# Patient Record
Sex: FEMALE | Race: WHITE | NOT HISPANIC OR LATINO | Employment: OTHER | URBAN - METROPOLITAN AREA
[De-identification: names, ages, dates, MRNs, and addresses within clinical notes are randomized per-mention and may not be internally consistent; named-entity substitution may affect disease eponyms.]

---

## 2017-09-12 ENCOUNTER — HISTORICAL (OUTPATIENT)
Dept: RADIOLOGY | Facility: HOSPITAL | Age: 62
End: 2017-09-12

## 2017-10-09 ENCOUNTER — HISTORICAL (OUTPATIENT)
Dept: CARDIOLOGY | Facility: CLINIC | Age: 62
End: 2017-10-09

## 2017-10-09 LAB
ALBUMIN SERPL-MCNC: 3.7 GM/DL (ref 3.4–5)
ALBUMIN/GLOB SERPL: 1 RATIO (ref 1–2)
ALP SERPL-CCNC: 67 UNIT/L (ref 45–117)
ALT SERPL-CCNC: 22 UNIT/L (ref 12–78)
AST SERPL-CCNC: 23 UNIT/L (ref 15–37)
BILIRUB SERPL-MCNC: 0.5 MG/DL (ref 0.2–1)
BILIRUBIN DIRECT+TOT PNL SERPL-MCNC: 0.2 MG/DL
BILIRUBIN DIRECT+TOT PNL SERPL-MCNC: 0.3 MG/DL
BUN SERPL-MCNC: 14 MG/DL (ref 7–18)
CALCIUM SERPL-MCNC: 8.4 MG/DL (ref 8.5–10.1)
CHLORIDE SERPL-SCNC: 105 MMOL/L (ref 98–107)
CHOLEST SERPL-MCNC: 166 MG/DL
CHOLEST/HDLC SERPL: 2.1 {RATIO} (ref 0–4.4)
CO2 SERPL-SCNC: 28 MMOL/L (ref 21–32)
CREAT SERPL-MCNC: 0.5 MG/DL (ref 0.6–1.3)
GLOBULIN SER-MCNC: 3.7 GM/ML (ref 2.3–3.5)
GLUCOSE SERPL-MCNC: 101 MG/DL (ref 74–106)
HDLC SERPL-MCNC: 80 MG/DL
LDLC SERPL CALC-MCNC: 66 MG/DL (ref 0–130)
POTASSIUM SERPL-SCNC: 4.5 MMOL/L (ref 3.5–5.1)
PROT SERPL-MCNC: 7.4 GM/DL (ref 6.4–8.2)
SODIUM SERPL-SCNC: 140 MMOL/L (ref 136–145)
TRIGL SERPL-MCNC: 100 MG/DL
VLDLC SERPL CALC-MCNC: 20 MG/DL

## 2019-06-17 ENCOUNTER — HISTORICAL (OUTPATIENT)
Dept: CARDIOLOGY | Facility: CLINIC | Age: 64
End: 2019-06-17

## 2019-06-17 LAB
ALBUMIN SERPL-MCNC: 3.6 GM/DL (ref 3.4–5)
ALBUMIN/GLOB SERPL: 0.9 RATIO (ref 1.1–2)
ALP SERPL-CCNC: 77 UNIT/L (ref 45–117)
ALT SERPL-CCNC: 18 UNIT/L (ref 12–78)
AST SERPL-CCNC: 19 UNIT/L (ref 15–37)
BILIRUB SERPL-MCNC: 0.6 MG/DL (ref 0.2–1)
BILIRUBIN DIRECT+TOT PNL SERPL-MCNC: 0.2 MG/DL
BILIRUBIN DIRECT+TOT PNL SERPL-MCNC: 0.4 MG/DL
BUN SERPL-MCNC: 13 MG/DL (ref 7–18)
CALCIUM SERPL-MCNC: 9.5 MG/DL (ref 8.5–10.1)
CHLORIDE SERPL-SCNC: 105 MMOL/L (ref 98–107)
CHOLEST SERPL-MCNC: 166 MG/DL
CHOLEST/HDLC SERPL: 2.2 {RATIO} (ref 0–4.4)
CO2 SERPL-SCNC: 29 MMOL/L (ref 21–32)
CREAT SERPL-MCNC: 0.6 MG/DL (ref 0.6–1.3)
GLOBULIN SER-MCNC: 4.2 GM/ML (ref 2.3–3.5)
GLUCOSE SERPL-MCNC: 100 MG/DL (ref 74–106)
HDLC SERPL-MCNC: 75 MG/DL
LDLC SERPL CALC-MCNC: 75 MG/DL (ref 0–130)
POTASSIUM SERPL-SCNC: 4.4 MMOL/L (ref 3.5–5.1)
PROT SERPL-MCNC: 7.8 GM/DL (ref 6.4–8.2)
SODIUM SERPL-SCNC: 138 MMOL/L (ref 136–145)
TRIGL SERPL-MCNC: 78 MG/DL
VLDLC SERPL CALC-MCNC: 16 MG/DL

## 2019-07-01 ENCOUNTER — HISTORICAL (OUTPATIENT)
Dept: RADIOLOGY | Facility: HOSPITAL | Age: 64
End: 2019-07-01

## 2019-08-15 ENCOUNTER — HISTORICAL (OUTPATIENT)
Dept: CARDIOLOGY | Facility: HOSPITAL | Age: 64
End: 2019-08-15

## 2019-08-15 LAB
APTT PPP: 27.3 SECOND(S) (ref 23.3–37)
BUN SERPL-MCNC: 9 MG/DL (ref 7–18)
CALCIUM SERPL-MCNC: 9.2 MG/DL (ref 8.5–10.1)
CHLORIDE SERPL-SCNC: 103 MMOL/L (ref 98–107)
CO2 SERPL-SCNC: 33 MMOL/L (ref 21–32)
CREAT SERPL-MCNC: 0.6 MG/DL (ref 0.6–1.3)
CREAT/UREA NIT SERPL: 15
ERYTHROCYTE [DISTWIDTH] IN BLOOD BY AUTOMATED COUNT: 13.1 % (ref 11.5–14.5)
GLUCOSE SERPL-MCNC: 90 MG/DL (ref 74–106)
HCT VFR BLD AUTO: 38.7 % (ref 35–46)
HGB BLD-MCNC: 12.5 GM/DL (ref 12–16)
INR PPP: 0.96 (ref 0.9–1.2)
MCH RBC QN AUTO: 29.5 PG (ref 26–34)
MCHC RBC AUTO-ENTMCNC: 32.3 GM/DL (ref 31–37)
MCV RBC AUTO: 91.3 FL (ref 80–100)
PLATELET # BLD AUTO: 295 X10(3)/MCL (ref 130–400)
PMV BLD AUTO: 10.5 FL (ref 7.4–10.4)
POTASSIUM SERPL-SCNC: 4.2 MMOL/L (ref 3.5–5.1)
PROTHROMBIN TIME: 12.7 SECOND(S) (ref 11.9–14.4)
RBC # BLD AUTO: 4.24 X10(6)/MCL (ref 4–5.2)
SODIUM SERPL-SCNC: 139 MMOL/L (ref 136–145)
WBC # SPEC AUTO: 7.1 X10(3)/MCL (ref 4.5–11)

## 2019-08-22 ENCOUNTER — HISTORICAL (OUTPATIENT)
Dept: CARDIOLOGY | Facility: HOSPITAL | Age: 64
End: 2019-08-22

## 2019-08-28 ENCOUNTER — HISTORICAL (OUTPATIENT)
Dept: CARDIOLOGY | Facility: HOSPITAL | Age: 64
End: 2019-08-28

## 2019-08-28 LAB
ABS NEUT (OLG): 6.51 X10(3)/MCL (ref 2.1–9.2)
BASOPHILS # BLD AUTO: 0.1 X10(3)/MCL (ref 0–0.2)
BASOPHILS NFR BLD AUTO: 1 %
BUN SERPL-MCNC: 12 MG/DL (ref 7–18)
CALCIUM SERPL-MCNC: 9.5 MG/DL (ref 8.5–10.1)
CHLORIDE SERPL-SCNC: 101 MMOL/L (ref 98–107)
CO2 SERPL-SCNC: 25 MMOL/L (ref 21–32)
CREAT SERPL-MCNC: 0.57 MG/DL (ref 0.55–1.02)
EOSINOPHIL # BLD AUTO: 0.1 X10(3)/MCL (ref 0–0.9)
EOSINOPHIL NFR BLD AUTO: 1 %
ERYTHROCYTE [DISTWIDTH] IN BLOOD BY AUTOMATED COUNT: 13.3 % (ref 11.5–17)
GLUCOSE SERPL-MCNC: 93 MG/DL (ref 74–106)
HCT VFR BLD AUTO: 37.6 % (ref 37–47)
HGB BLD-MCNC: 12.4 GM/DL (ref 12–16)
LYMPHOCYTES # BLD AUTO: 2.3 X10(3)/MCL (ref 0.6–4.6)
LYMPHOCYTES NFR BLD AUTO: 23 %
MCH RBC QN AUTO: 29.9 PG (ref 27–31)
MCHC RBC AUTO-ENTMCNC: 33 GM/DL (ref 33–36)
MCV RBC AUTO: 90.6 FL (ref 80–94)
MONOCYTES # BLD AUTO: 0.9 X10(3)/MCL (ref 0.1–1.3)
MONOCYTES NFR BLD AUTO: 9 %
NEUTROPHILS # BLD AUTO: 6.51 X10(3)/MCL (ref 2.1–9.2)
NEUTROPHILS NFR BLD AUTO: 65 %
PLATELET # BLD AUTO: 262 X10(3)/MCL (ref 130–400)
PMV BLD AUTO: 10.4 FL (ref 9.4–12.4)
POTASSIUM SERPL-SCNC: 4.9 MMOL/L (ref 3.5–5.1)
RBC # BLD AUTO: 4.15 X10(6)/MCL (ref 4.2–5.4)
SODIUM SERPL-SCNC: 135 MMOL/L (ref 136–145)
WBC # SPEC AUTO: 10 X10(3)/MCL (ref 4.5–11.5)

## 2019-09-09 ENCOUNTER — HISTORICAL (OUTPATIENT)
Dept: CARDIOLOGY | Facility: HOSPITAL | Age: 64
End: 2019-09-09

## 2019-09-09 LAB
ABS NEUT (OLG): 4.12 X10(3)/MCL (ref 2.1–9.2)
BASOPHILS # BLD AUTO: 0.1 X10(3)/MCL (ref 0–0.2)
BASOPHILS NFR BLD AUTO: 1 %
BUN SERPL-MCNC: 13 MG/DL (ref 7–18)
CALCIUM SERPL-MCNC: 9 MG/DL (ref 8.5–10.1)
CHLORIDE SERPL-SCNC: 100 MMOL/L (ref 98–107)
CO2 SERPL-SCNC: 29 MMOL/L (ref 21–32)
CREAT SERPL-MCNC: 0.66 MG/DL (ref 0.55–1.02)
CREAT/UREA NIT SERPL: 19.7
EOSINOPHIL # BLD AUTO: 0.2 X10(3)/MCL (ref 0–0.9)
EOSINOPHIL NFR BLD AUTO: 4 %
ERYTHROCYTE [DISTWIDTH] IN BLOOD BY AUTOMATED COUNT: 13.2 % (ref 11.5–17)
GLUCOSE SERPL-MCNC: 95 MG/DL (ref 74–106)
HCT VFR BLD AUTO: 40 % (ref 37–47)
HGB BLD-MCNC: 12.8 GM/DL (ref 12–16)
LYMPHOCYTES # BLD AUTO: 2 X10(3)/MCL (ref 0.6–4.6)
LYMPHOCYTES NFR BLD AUTO: 28 %
MCH RBC QN AUTO: 29.5 PG (ref 27–31)
MCHC RBC AUTO-ENTMCNC: 32 GM/DL (ref 33–36)
MCV RBC AUTO: 92.2 FL (ref 80–94)
MONOCYTES # BLD AUTO: 0.6 X10(3)/MCL (ref 0.1–1.3)
MONOCYTES NFR BLD AUTO: 9 %
NEUTROPHILS # BLD AUTO: 4.12 X10(3)/MCL (ref 2.1–9.2)
NEUTROPHILS NFR BLD AUTO: 58 %
PLATELET # BLD AUTO: 290 X10(3)/MCL (ref 130–400)
PMV BLD AUTO: 9.6 FL (ref 9.4–12.4)
POTASSIUM SERPL-SCNC: 4.7 MMOL/L (ref 3.5–5.1)
RBC # BLD AUTO: 4.34 X10(6)/MCL (ref 4.2–5.4)
SODIUM SERPL-SCNC: 136 MMOL/L (ref 136–145)
WBC # SPEC AUTO: 7.1 X10(3)/MCL (ref 4.5–11.5)

## 2019-10-15 ENCOUNTER — HISTORICAL (OUTPATIENT)
Dept: RADIOLOGY | Facility: HOSPITAL | Age: 64
End: 2019-10-15

## 2019-10-15 LAB
ALBUMIN SERPL-MCNC: 3.3 GM/DL (ref 3.4–5)
ALBUMIN/GLOB SERPL: 0.8 RATIO (ref 1.1–2)
ALP SERPL-CCNC: 70 UNIT/L (ref 45–117)
ALT SERPL-CCNC: 18 UNIT/L (ref 12–78)
AST SERPL-CCNC: 21 UNIT/L (ref 15–37)
BILIRUB SERPL-MCNC: 0.6 MG/DL (ref 0.2–1)
BILIRUBIN DIRECT+TOT PNL SERPL-MCNC: 0.2 MG/DL (ref 0–0.2)
BILIRUBIN DIRECT+TOT PNL SERPL-MCNC: 0.4 MG/DL
BUN SERPL-MCNC: 10 MG/DL (ref 7–18)
CALCIUM SERPL-MCNC: 8.8 MG/DL (ref 8.5–10.1)
CHLORIDE SERPL-SCNC: 102 MMOL/L (ref 98–107)
CHOLEST SERPL-MCNC: 142 MG/DL
CHOLEST/HDLC SERPL: 1.6 {RATIO} (ref 0–4.4)
CO2 SERPL-SCNC: 29 MMOL/L (ref 21–32)
CREAT SERPL-MCNC: 0.7 MG/DL (ref 0.6–1.3)
GLOBULIN SER-MCNC: 3.9 GM/ML (ref 2.3–3.5)
GLUCOSE SERPL-MCNC: 92 MG/DL (ref 74–106)
HDLC SERPL-MCNC: 88 MG/DL (ref 40–59)
LDLC SERPL CALC-MCNC: 41 MG/DL
POTASSIUM SERPL-SCNC: 4.4 MMOL/L (ref 3.5–5.1)
PROT SERPL-MCNC: 7.2 GM/DL (ref 6.4–8.2)
SODIUM SERPL-SCNC: 135 MMOL/L (ref 136–145)
TRIGL SERPL-MCNC: 63 MG/DL
VLDLC SERPL CALC-MCNC: 13 MG/DL

## 2021-12-10 ENCOUNTER — HISTORICAL (OUTPATIENT)
Dept: LAB | Facility: HOSPITAL | Age: 66
End: 2021-12-10

## 2021-12-10 LAB
ABS NEUT (OLG): 3.27 X10(3)/MCL (ref 2.1–9.2)
BASOPHILS # BLD AUTO: 0.1 X10(3)/MCL (ref 0–0.2)
BASOPHILS NFR BLD AUTO: 1 %
BUN SERPL-MCNC: 8.8 MG/DL (ref 9.8–20.1)
CALCIUM SERPL-MCNC: 8.9 MG/DL (ref 8.7–10.5)
CHLORIDE SERPL-SCNC: 99 MMOL/L (ref 98–107)
CO2 SERPL-SCNC: 28 MMOL/L (ref 23–31)
CREAT SERPL-MCNC: 0.65 MG/DL (ref 0.55–1.02)
CREAT/UREA NIT SERPL: 14
EOSINOPHIL # BLD AUTO: 0.3 X10(3)/MCL (ref 0–0.9)
EOSINOPHIL NFR BLD AUTO: 4 %
ERYTHROCYTE [DISTWIDTH] IN BLOOD BY AUTOMATED COUNT: 25.2 % (ref 11.5–17)
GLUCOSE SERPL-MCNC: 87 MG/DL (ref 82–115)
HCT VFR BLD AUTO: 38.2 % (ref 37–47)
HGB BLD-MCNC: 11.6 GM/DL (ref 12–16)
IMM GRANULOCYTES # BLD AUTO: 0.01 % (ref 0–0.02)
IMM GRANULOCYTES NFR BLD AUTO: 0.2 % (ref 0–0.43)
LYMPHOCYTES # BLD AUTO: 2.4 X10(3)/MCL (ref 0.6–4.6)
LYMPHOCYTES NFR BLD AUTO: 36 %
MCH RBC QN AUTO: 24.5 PG (ref 27–31)
MCHC RBC AUTO-ENTMCNC: 30.4 GM/DL (ref 33–36)
MCV RBC AUTO: 80.8 FL (ref 80–94)
MONOCYTES # BLD AUTO: 0.6 X10(3)/MCL (ref 0.1–1.3)
MONOCYTES NFR BLD AUTO: 9 %
NEUTROPHILS # BLD AUTO: 3.27 X10(3)/MCL (ref 1.4–7.9)
NEUTROPHILS NFR BLD AUTO: 49 %
PLATELET # BLD AUTO: 261 X10(3)/MCL (ref 130–400)
PMV BLD AUTO: 10.2 FL (ref 9.4–12.4)
POTASSIUM SERPL-SCNC: 4 MMOL/L (ref 3.5–5.1)
RBC # BLD AUTO: 4.73 X10(6)/MCL (ref 4.2–5.4)
SODIUM SERPL-SCNC: 133 MMOL/L (ref 136–145)
WBC # SPEC AUTO: 6.6 X10(3)/MCL (ref 4.5–11.5)

## 2021-12-16 ENCOUNTER — HISTORICAL (OUTPATIENT)
Dept: ADMINISTRATIVE | Facility: HOSPITAL | Age: 66
End: 2021-12-16

## 2022-04-10 ENCOUNTER — HISTORICAL (OUTPATIENT)
Dept: ADMINISTRATIVE | Facility: HOSPITAL | Age: 67
End: 2022-04-10
Payer: MEDICAID

## 2022-04-30 VITALS
HEIGHT: 62 IN | WEIGHT: 140.44 LBS | OXYGEN SATURATION: 97 % | SYSTOLIC BLOOD PRESSURE: 124 MMHG | DIASTOLIC BLOOD PRESSURE: 77 MMHG | BODY MASS INDEX: 25.84 KG/M2

## 2022-07-07 ENCOUNTER — OFFICE VISIT (OUTPATIENT)
Dept: CARDIOLOGY | Facility: CLINIC | Age: 67
End: 2022-07-07
Payer: MEDICARE

## 2022-07-07 VITALS
OXYGEN SATURATION: 97 % | HEIGHT: 63 IN | WEIGHT: 142.19 LBS | DIASTOLIC BLOOD PRESSURE: 62 MMHG | SYSTOLIC BLOOD PRESSURE: 109 MMHG | TEMPERATURE: 98 F | HEART RATE: 54 BPM | BODY MASS INDEX: 25.2 KG/M2 | RESPIRATION RATE: 20 BRPM

## 2022-07-07 DIAGNOSIS — F17.200 SMOKING: ICD-10-CM

## 2022-07-07 DIAGNOSIS — K92.2 GASTROINTESTINAL HEMORRHAGE, UNSPECIFIED GASTROINTESTINAL HEMORRHAGE TYPE: ICD-10-CM

## 2022-07-07 DIAGNOSIS — I73.9 PAD (PERIPHERAL ARTERY DISEASE): ICD-10-CM

## 2022-07-07 DIAGNOSIS — E78.5 HYPERLIPIDEMIA LDL GOAL <70: ICD-10-CM

## 2022-07-07 DIAGNOSIS — I10 HYPERTENSION, UNSPECIFIED TYPE: Primary | ICD-10-CM

## 2022-07-07 DIAGNOSIS — I25.10 CORONARY ARTERY DISEASE, UNSPECIFIED VESSEL OR LESION TYPE, UNSPECIFIED WHETHER ANGINA PRESENT, UNSPECIFIED WHETHER NATIVE OR TRANSPLANTED HEART: ICD-10-CM

## 2022-07-07 PROCEDURE — 99214 OFFICE O/P EST MOD 30 MIN: CPT | Mod: PBBFAC | Performed by: INTERNAL MEDICINE

## 2022-07-07 RX ORDER — IBUPROFEN 200 MG
1 TABLET ORAL DAILY
Qty: 28 PATCH | Refills: 2 | Status: ON HOLD | OUTPATIENT
Start: 2022-07-07 | End: 2023-01-22 | Stop reason: HOSPADM

## 2022-07-07 RX ORDER — ESOMEPRAZOLE MAGNESIUM 20 MG/1
1 TABLET, DELAYED RELEASE ORAL NIGHTLY
COMMUNITY

## 2022-07-07 RX ORDER — RIVAROXABAN 2.5 MG/1
1 TABLET, FILM COATED ORAL 2 TIMES DAILY
COMMUNITY
Start: 2021-11-10 | End: 2022-11-28 | Stop reason: SDUPTHER

## 2022-07-07 RX ORDER — ASPIRIN 81 MG
1 TABLET, DELAYED RELEASE (ENTERIC COATED) ORAL EVERY OTHER DAY
COMMUNITY

## 2022-07-07 RX ORDER — ATORVASTATIN CALCIUM 40 MG/1
1 TABLET, FILM COATED ORAL NIGHTLY
COMMUNITY
Start: 2022-02-16 | End: 2022-11-28 | Stop reason: SDUPTHER

## 2022-07-07 RX ORDER — DM/P-EPHED/ACETAMINOPH/DOXYLAM 30-7.5/3
2 LIQUID (ML) ORAL
Qty: 108 LOZENGE | Refills: 2 | Status: ON HOLD | OUTPATIENT
Start: 2022-07-07 | End: 2023-01-22 | Stop reason: HOSPADM

## 2022-07-07 RX ORDER — METOPROLOL TARTRATE 25 MG/1
12.5 TABLET, FILM COATED ORAL 2 TIMES DAILY
COMMUNITY
Start: 2021-11-10 | End: 2022-11-28 | Stop reason: SDUPTHER

## 2022-07-07 RX ORDER — ASPIRIN 81 MG/1
1 TABLET ORAL DAILY
COMMUNITY
End: 2023-07-18 | Stop reason: SDUPTHER

## 2022-07-07 RX ORDER — AMLODIPINE AND BENAZEPRIL HYDROCHLORIDE 5; 10 MG/1; MG/1
1 CAPSULE ORAL DAILY
COMMUNITY
Start: 2021-11-10 | End: 2022-11-28 | Stop reason: SDUPTHER

## 2022-07-07 RX ORDER — EZETIMIBE 10 MG/1
1 TABLET ORAL DAILY
COMMUNITY
Start: 2021-12-14 | End: 2022-11-28 | Stop reason: SDUPTHER

## 2022-07-07 RX ORDER — CYCLOBENZAPRINE HCL 10 MG
10 TABLET ORAL
COMMUNITY
Start: 2021-11-11

## 2022-07-07 NOTE — PROGRESS NOTES
Chief Complaint   Patient presents with    6 month f/u denies chest pain/sob       66-year-old female PMH of nonobstructive CAD, hypertension, PAD, HLD and tobacco use presented to cardiology clinic for 6-month follow-up. Office visit on 11/10/2021 patient right lower extremity DP/PT was nondopplerable or palpable. Dr. Samson was contacted and was going to perform angiogram on 11/11/2021. Patient arrived at [facility] 11/11/2021 and was found to be anemic hemoglobin of 6.8 and transfused 2 units of PRBC.Upper endoscopy revealed angiectasia in the duodenum treated with argon plasma coagulation. Colonoscopy revealed small internal hemorrhoids and a few small diverticula in the sigmoid colon. Arterial ultrasound was performed which revealed severe decrease in arterial flow in the right lower extremity. Ultrasound was negative for DVT. 12/17/2021 common femoral endarterectomy was performed. Echo revealed LVEF 45%.     Today pt reports feeling well. No further claudication. She has been more active but reports fatigue on walking long distances. She can comfortable walk 2 blocks. She denies lightheadedness, dizziness,  weakness, chest pain, shortness of breath, peripheral edema. No bleeding on ASA and xarelto.      Review of Systems  Constitutional: no fever, fatigue, weakness  Eye: no vision loss, eye redness, drainage, or pain  ENMT: no sore throat, ear pain, sinus pain/congestion, nasal congestion/drainage  Respiratory: no cough, no wheezing, no shortness of breath  Cardiovascular: no chest pain, no palpitations, no edema  Gastrointestinal: no nausea, vomiting, or diarrhea. No abdominal pain  Genitourinary: no dysuria, no urinary frequency or urgency, no hematuria  Hema/Lymph: no abnormal bruising or bleeding  Endocrine: no heat or cold intolerance, no excessive thirst or excessive urination  Musculoskeletal: no muscle or joint pain, no joint swelling. Right groin pain  Integumentary: no skin rash or abnormal  lesion  Neurologic: no headache, no dizziness, no weakness or numbness    Physical Exam Vitals & Measurements   Vitals:    07/07/22 0921   BP: 109/62   Pulse: (!) 54   Resp: 20   Temp: 97.7 °F (36.5 °C)         General: alert, oriented X3. not in acute distress.  Eye: PERRLA, EOMI, clear conjunctiva, eyelids normal.  HENT: oropharynx and nasal mucosal surfaces moist.  Neck: no JVD, no LAD. No thyromegaly. No LAD.  Respiratory: clear to auscultation bilaterally. no wheezes, crackles or ronchi. Symmetrical chest expansion.  Cardiovascular: regular rate and rhythm without murmurs, gallops or rubs.  Gastrointestinal: soft, non-tender, non-distended. normal bowel sounds. No masses to palpation.  Genitourinary: no CVA tenderness to palpation.  Musculoskeletal: moves all extremities purposefully without any obvious deformities.  Extremities: 2+ pulses bilaterally. no peripheral edema. Right groin hematoma present. Surgical scar healing well  Integumentary: no rashes or skin lesions present  Neurologic: responds to questions appropriately. no focal signs.  Psych: normal mood and affect      Current Outpatient Medications:     amlodipine-benazepril 5-10 mg (LOTREL) 5-10 mg per capsule, Take 1 capsule by mouth Daily., Disp: , Rfl:     aspirin (ECOTRIN) 81 MG EC tablet, Take 1 tablet by mouth Daily., Disp: , Rfl:     atorvastatin (LIPITOR) 40 MG tablet, Take 1 tablet by mouth nightly., Disp: , Rfl:     cyclobenzaprine (FLEXERIL) 10 MG tablet, Take 10 mg by mouth as needed., Disp: , Rfl:     docusate sodium 100 mg capsule, Take 1 tablet by mouth every other day., Disp: , Rfl:     esomeprazole magnesium 20 mg TbEC, Take 1 capsule by mouth nightly., Disp: , Rfl:     ezetimibe (ZETIA) 10 mg tablet, Take 1 tablet by mouth Daily., Disp: , Rfl:     metoprolol tartrate (LOPRESSOR) 25 MG tablet, Take 12.5 mg by mouth 2 (two) times a day., Disp: , Rfl:     rivaroxaban (XARELTO) 2.5 mg Tab, Take 1 tablet by mouth 2 (two) times a  day., Disp: , Rfl:         Assessment/Plan  PAD  -Level 11/11/2021 arterial ultrasound was performed which revealed severe decrease in arterial flow in the right lower extremity. Ultrasound was negative for DVT.   -12/17/2021 common femoral endarterectomy was performed. Echo revealed LVEF 45%.  -Continue aspirin 81 mg, Xarelto 2.5 mg twice daily, and Lipitor 40 mg    Hypertension  -/63 today  -Continue metoprolol tartrate 12.5 twice daily, Lotrel    HLD  -LDL 41 at goal in 2019  Will obtain repeat panel  -Continue Lipitor 40 mg and zetia 10  Pt reports intolerance to lipitor 80mg in the past    Tobacco user  -Patient is smoking more now, 6 cig/day   Discussed deleterious effects of smoking and how smoking decreases lifespan.  -Patient will try hypnotherapy  - agrees to try nicotine patches and lozenges      Nonobstructive CAD  -LHC performed 4/13/2016 revealed LAD 50% stenosis, left circumflex 30% proximal stenosis, RCA 40% stenosis with normal LVEF 60%. Admitted for medical management at the time and to consider FFR of LAD if patient develops recurrent angina.  Asymptomatic    Hx of GI bleed  12/2021  Pt underwent a colonoscopy and endoscopy that revealed a single recently bleeding angiectasia in the duodenum treated with argon plasma coagulation. Since then her blood counts have improved. No further bleeding.      FLP  NRT  Return to clinic 6 months

## 2022-07-12 ENCOUNTER — HOSPITAL ENCOUNTER (OUTPATIENT)
Dept: RADIOLOGY | Facility: HOSPITAL | Age: 67
Discharge: HOME | End: 2022-07-12
Attending: NURSE PRACTITIONER
Payer: MEDICARE

## 2022-07-12 DIAGNOSIS — M54.6 PAIN IN THORACIC SPINE: ICD-10-CM

## 2022-07-12 PROCEDURE — 72070 X-RAY EXAM THORAC SPINE 2VWS: CPT | Mod: TC

## 2022-08-22 DIAGNOSIS — N95.9 MENOPAUSAL AND POSTMENOPAUSAL DISORDER: Primary | ICD-10-CM

## 2022-08-24 ENCOUNTER — HOSPITAL ENCOUNTER (OUTPATIENT)
Dept: RADIOLOGY | Facility: HOSPITAL | Age: 67
Discharge: HOME | End: 2022-08-24
Attending: NURSE PRACTITIONER
Payer: MEDICARE

## 2022-08-24 DIAGNOSIS — N95.9 MENOPAUSAL AND POSTMENOPAUSAL DISORDER: ICD-10-CM

## 2022-08-24 PROCEDURE — 77080 DXA BONE DENSITY AXIAL: CPT | Mod: TC

## 2022-11-28 DIAGNOSIS — I73.9 PAD (PERIPHERAL ARTERY DISEASE): ICD-10-CM

## 2022-11-28 DIAGNOSIS — I10 HYPERTENSION, UNSPECIFIED TYPE: Primary | ICD-10-CM

## 2022-11-28 DIAGNOSIS — I25.10 CORONARY ARTERY DISEASE, UNSPECIFIED VESSEL OR LESION TYPE, UNSPECIFIED WHETHER ANGINA PRESENT, UNSPECIFIED WHETHER NATIVE OR TRANSPLANTED HEART: ICD-10-CM

## 2022-11-28 DIAGNOSIS — E78.5 HYPERLIPIDEMIA LDL GOAL <70: ICD-10-CM

## 2022-11-28 RX ORDER — EZETIMIBE 10 MG/1
10 TABLET ORAL DAILY
Qty: 90 TABLET | Refills: 6 | Status: SHIPPED | OUTPATIENT
Start: 2022-11-28 | End: 2023-07-18 | Stop reason: SDUPTHER

## 2022-11-28 RX ORDER — ATORVASTATIN CALCIUM 40 MG/1
40 TABLET, FILM COATED ORAL NIGHTLY
Qty: 90 TABLET | Refills: 6 | Status: SHIPPED | OUTPATIENT
Start: 2022-11-28 | End: 2023-07-18 | Stop reason: SDUPTHER

## 2022-11-28 RX ORDER — RIVAROXABAN 2.5 MG/1
1 TABLET, FILM COATED ORAL 2 TIMES DAILY
Qty: 90 TABLET | Refills: 6 | Status: SHIPPED | OUTPATIENT
Start: 2022-11-28 | End: 2023-07-18 | Stop reason: SDUPTHER

## 2022-11-28 RX ORDER — AMLODIPINE AND BENAZEPRIL HYDROCHLORIDE 5; 10 MG/1; MG/1
1 CAPSULE ORAL DAILY
Qty: 90 CAPSULE | Refills: 6 | Status: SHIPPED | OUTPATIENT
Start: 2022-11-28 | End: 2023-07-05

## 2022-11-28 RX ORDER — METOPROLOL TARTRATE 25 MG/1
12.5 TABLET, FILM COATED ORAL 2 TIMES DAILY
Qty: 90 TABLET | Refills: 6 | Status: SHIPPED | OUTPATIENT
Start: 2022-11-28 | End: 2023-07-18 | Stop reason: SDUPTHER

## 2022-12-23 ENCOUNTER — HOSPITAL ENCOUNTER (OUTPATIENT)
Facility: HOSPITAL | Age: 67
Discharge: HOME | End: 2022-12-24
Attending: GENERAL PRACTICE | Admitting: GENERAL PRACTICE
Payer: MEDICARE

## 2022-12-23 DIAGNOSIS — D64.9 SYMPTOMATIC ANEMIA: Primary | ICD-10-CM

## 2022-12-23 LAB
ABO + RH BLD: NORMAL
ALBUMIN SERPL-MCNC: 3.2 G/DL (ref 3.4–4.8)
ALBUMIN/GLOB SERPL: 0.8 RATIO (ref 1.1–2)
ALP SERPL-CCNC: 58 UNIT/L (ref 40–150)
ALT SERPL-CCNC: 14 UNIT/L (ref 0–55)
AST SERPL-CCNC: 19 UNIT/L (ref 5–34)
BASOPHILS # BLD AUTO: 0.06 X10(3)/MCL (ref 0–0.2)
BASOPHILS NFR BLD AUTO: 1 %
BILIRUBIN DIRECT+TOT PNL SERPL-MCNC: 0.6 MG/DL
BLD PROD TYP BPU: NORMAL
BLOOD UNIT EXPIRATION DATE: NORMAL
BLOOD UNIT TYPE CODE: 5100
BUN SERPL-MCNC: 12.4 MG/DL (ref 9.8–20.1)
CALCIUM SERPL-MCNC: 8.6 MG/DL (ref 8.4–10.2)
CHLORIDE SERPL-SCNC: 99 MMOL/L (ref 98–107)
CO2 SERPL-SCNC: 25 MMOL/L (ref 23–31)
COLOR STL: NORMAL
CONSISTENCY STL: NORMAL
CREAT SERPL-MCNC: 0.72 MG/DL (ref 0.55–1.02)
CROSSMATCH INTERPRETATION: NORMAL
DISPENSE STATUS: NORMAL
EOSINOPHIL # BLD AUTO: 0.2 X10(3)/MCL (ref 0–0.9)
EOSINOPHIL NFR BLD AUTO: 3.2 %
ERYTHROCYTE [DISTWIDTH] IN BLOOD BY AUTOMATED COUNT: 18 % (ref 11–14.5)
FERRITIN SERPL-MCNC: 17.2 NG/ML (ref 4.63–204)
GFR SERPLBLD CREATININE-BSD FMLA CKD-EPI: >60 MLS/MIN/1.73/M2
GLOBULIN SER-MCNC: 3.9 GM/DL (ref 2.4–3.5)
GLUCOSE SERPL-MCNC: 116 MG/DL (ref 82–115)
GROUP & RH: NORMAL
HCT VFR BLD AUTO: 23.3 % (ref 37–47)
HEMOCCULT SP1 STL QL: NEGATIVE
HGB BLD-MCNC: 6.8 GM/DL (ref 12–16)
INDIRECT COOMBS GEL: NORMAL
IRON SATN MFR SERPL: 5 % (ref 20–50)
IRON SERPL-MCNC: 15 UG/DL (ref 50–170)
LYMPHOCYTES # BLD AUTO: 2.06 X10(3)/MCL (ref 0.6–4.6)
LYMPHOCYTES NFR BLD AUTO: 33 %
MCH RBC QN AUTO: 20.9 PG
MCHC RBC AUTO-ENTMCNC: 29.2 MG/DL (ref 33–36)
MCV RBC AUTO: 71.5 FL (ref 80–94)
MONOCYTES # BLD AUTO: 0.53 X10(3)/MCL (ref 0.1–1.3)
MONOCYTES NFR BLD AUTO: 8.5 %
NEUTROPHILS # BLD AUTO: 3.38 X10(3)/MCL (ref 2.1–9.2)
NEUTROPHILS NFR BLD AUTO: 54.1 %
PLATELET # BLD AUTO: 374 X10(3)/MCL (ref 140–371)
PMV BLD AUTO: 9.7 FL (ref 9.4–12.4)
POTASSIUM SERPL-SCNC: 3.7 MMOL/L (ref 3.5–5.1)
PROT SERPL-MCNC: 7.1 GM/DL (ref 5.8–7.6)
RBC # BLD AUTO: 3.26 X10(6)/MCL (ref 4.2–5.4)
SODIUM SERPL-SCNC: 132 MMOL/L (ref 136–145)
TIBC SERPL-MCNC: 310 UG/DL (ref 70–310)
TIBC SERPL-MCNC: 325 UG/DL (ref 250–450)
TRANSFERRIN SERPL-MCNC: 308 MG/DL (ref 173–360)
UNIT NUMBER: NORMAL
VIT B12 SERPL-MCNC: 422 PG/ML (ref 213–816)
WBC # SPEC AUTO: 6.2 X10(3)/MCL (ref 4.5–11.5)

## 2022-12-23 PROCEDURE — 80053 COMPREHEN METABOLIC PANEL: CPT | Performed by: GENERAL PRACTICE

## 2022-12-23 PROCEDURE — 85025 COMPLETE CBC W/AUTO DIFF WBC: CPT | Performed by: GENERAL PRACTICE

## 2022-12-23 PROCEDURE — 82607 VITAMIN B-12: CPT | Performed by: GENERAL PRACTICE

## 2022-12-23 PROCEDURE — 36415 COLL VENOUS BLD VENIPUNCTURE: CPT | Performed by: GENERAL PRACTICE

## 2022-12-23 PROCEDURE — 99285 EMERGENCY DEPT VISIT HI MDM: CPT | Mod: 25

## 2022-12-23 PROCEDURE — 86900 BLOOD TYPING SEROLOGIC ABO: CPT | Performed by: FAMILY MEDICINE

## 2022-12-23 PROCEDURE — 86920 COMPATIBILITY TEST SPIN: CPT | Performed by: FAMILY MEDICINE

## 2022-12-23 PROCEDURE — G0378 HOSPITAL OBSERVATION PER HR: HCPCS

## 2022-12-23 PROCEDURE — 36415 COLL VENOUS BLD VENIPUNCTURE: CPT | Performed by: FAMILY MEDICINE

## 2022-12-23 PROCEDURE — 82728 ASSAY OF FERRITIN: CPT | Performed by: GENERAL PRACTICE

## 2022-12-23 PROCEDURE — 83550 IRON BINDING TEST: CPT | Performed by: GENERAL PRACTICE

## 2022-12-23 PROCEDURE — 36430 TRANSFUSION BLD/BLD COMPNT: CPT

## 2022-12-23 PROCEDURE — P9016 RBC LEUKOCYTES REDUCED: HCPCS | Performed by: FAMILY MEDICINE

## 2022-12-23 PROCEDURE — 82272 OCCULT BLD FECES 1-3 TESTS: CPT | Performed by: GENERAL PRACTICE

## 2022-12-23 RX ORDER — SODIUM CHLORIDE 0.9 % (FLUSH) 0.9 %
10 SYRINGE (ML) INJECTION
Status: DISCONTINUED | OUTPATIENT
Start: 2022-12-23 | End: 2022-12-24 | Stop reason: HOSPADM

## 2022-12-23 RX ORDER — HYDROCODONE BITARTRATE AND ACETAMINOPHEN 500; 5 MG/1; MG/1
TABLET ORAL
Status: DISCONTINUED | OUTPATIENT
Start: 2022-12-23 | End: 2022-12-24 | Stop reason: HOSPADM

## 2022-12-23 RX ORDER — TALC
6 POWDER (GRAM) TOPICAL NIGHTLY PRN
Status: DISCONTINUED | OUTPATIENT
Start: 2022-12-23 | End: 2022-12-24 | Stop reason: HOSPADM

## 2022-12-23 NOTE — ED NOTES
Called  Arianne who is nurse who will be receiving pt to room 124  inpt fo rblood transfusion.  She will call me back - shes discharging a pt now.

## 2022-12-23 NOTE — ED PROVIDER NOTES
Encounter Date: 12/23/2022       History     Chief Complaint   Patient presents with    abnormal labs      Pt sent here by Dr Herrera for low H&H; pt states went in for routine f/u for osteoporosis and had labs drawn; does report weakness; had same situation last year at this time; currently on Xarelto BID      Patient is a 67-year-old  female presents the emergency room from Dr. Peterson office for low H&H. Patient states that she went for a normal routine osteoporosis visit and labs showed that her H&H was low. Patient also endorses some weakness for the last couple of weeks and patient is on Xarelto. Patient denies any Melanna or bright red blood per rectum. Patient had colonoscopy last year.    The history is provided by the patient. No  was used.   Review of patient's allergies indicates:   Allergen Reactions    Naproxen Swelling     Past Medical History:   Diagnosis Date    Coronary artery disease     Hyperlipidemia     Hypertension      Past Surgical History:   Procedure Laterality Date    CARDIAC CATHETERIZATION      CORONARY ANGIOPLASTY       Family History   Problem Relation Age of Onset    Heart disease Mother     Heart attack Mother     Heart failure Father      Social History     Tobacco Use    Smoking status: Every Day     Packs/day: 0.50     Types: Cigarettes    Smokeless tobacco: Never   Substance Use Topics    Alcohol use: Not Currently    Drug use: Never     Review of Systems   Constitutional: Negative.    HENT: Negative.     Eyes: Negative.    Respiratory: Negative.     Cardiovascular: Negative.    Gastrointestinal: Negative.    Endocrine: Negative.    Genitourinary: Negative.    Musculoskeletal: Negative.    Skin: Negative.    Allergic/Immunologic: Negative.    Neurological: Negative.    Hematological: Negative.    Psychiatric/Behavioral: Negative.       Physical Exam     Initial Vitals [12/23/22 0944]   BP Pulse Resp Temp SpO2   129/74 61 18 97.5 °F (36.4 °C) 100 %       MAP       --         Physical Exam    ED Course   Procedures  Labs Reviewed   COMPREHENSIVE METABOLIC PANEL - Abnormal; Notable for the following components:       Result Value    Sodium Level 132 (*)     Glucose Level 116 (*)     Albumin Level 3.2 (*)     Globulin 3.9 (*)     Albumin/Globulin Ratio 0.8 (*)     All other components within normal limits   CBC WITH DIFFERENTIAL - Abnormal; Notable for the following components:    RBC 3.26 (*)     Hgb 6.8 (*)     Hct 23.3 (*)     MCV 71.5 (*)     MCHC 29.2 (*)     RDW 18.0 (*)     Platelet 374 (*)     All other components within normal limits   CBC W/ AUTO DIFFERENTIAL    Narrative:     The following orders were created for panel order CBC auto differential.  Procedure                               Abnormality         Status                     ---------                               -----------         ------                     CBC with Differential[[id number]]        Abnormal            Final result                 Please view results for these tests on the individual orders.   OCCULT BLOOD X 3, STOOL   OCCULT BLOOD,STOOL,DIAGNOSTIC (1-3)    Narrative:     The following orders were created for panel order Occult Blood, Stool, Diagnostic (1-3).  Procedure                               Abnormality         Status                     ---------                               -----------         ------                     Occult blood x 3, stool[[id number]]                          Final result               Occult Blood, Stool 2nd ...[[id number]]                                                   Please view results for these tests on the individual orders.   OCCULT BLOOD, STOOL 2ND SPECIMEN          Imaging Results    None          Medications - No data to display  Medical Decision Making:   Initial Assessment:   Anemia   Differential Diagnosis:   Symptomatic anemia     Clinical Tests:   Lab Tests: Ordered  Radiological Study: Ordered  Admit to inpatient for blood  transfusion     Spoke with Dr Mario at 1155am he accepts admission                         Clinical Impression:   Final diagnoses:  [D64.9] Symptomatic anemia (Primary)        ED Disposition Condition    Observation Stable                Dileep Alanis MD  12/23/22 1202

## 2022-12-23 NOTE — PLAN OF CARE
Problem: Adult Inpatient Plan of Care  Goal: Plan of Care Review  Outcome: Ongoing, Progressing  Goal: Patient-Specific Goal (Individualized)  Outcome: Ongoing, Progressing  Goal: Absence of Hospital-Acquired Illness or Injury  Outcome: Ongoing, Progressing  Intervention: Identify and Manage Fall Risk  Flowsheets (Taken 12/23/2022 1715)  Safety Promotion/Fall Prevention:   assistive device/personal item within reach   Fall Risk reviewed with patient/family   nonskid shoes/socks when out of bed  Intervention: Prevent Skin Injury  Flowsheets (Taken 12/23/2022 1715)  Body Position: position changed independently  Skin Protection: adhesive use limited  Goal: Optimal Comfort and Wellbeing  Outcome: Ongoing, Progressing  Intervention: Monitor Pain and Promote Comfort  Flowsheets (Taken 12/23/2022 1715)  Pain Management Interventions:   care clustered   quiet environment facilitated  Intervention: Provide Person-Centered Care  Flowsheets (Taken 12/23/2022 1715)  Trust Relationship/Rapport:   care explained   questions answered   questions encouraged   reassurance provided  Goal: Readiness for Transition of Care  Outcome: Ongoing, Progressing

## 2022-12-23 NOTE — PLAN OF CARE
[facility] - Medical Surgical Unit  Initial Discharge Assessment       Primary Care Provider: CARLA Gandhi    Admission Diagnosis: Symptomatic anemia [D64.9]    Admission Date: 12/23/2022  Expected Discharge Date:     Discharge Barriers Identified: None    Payor: HUMANA MANAGED MEDICARE / Plan: HUMANA SNP (SPECIAL NEEDS PLAN) / Product Type: Medicare Advantage /     Extended Emergency Contact Information  Primary Emergency Contact: Jimena Brown  Mobile Phone: [phone]  Relation: Friend  Preferred language: English   needed? No    Discharge Plan A: Home with [facility] - [place], [place] - [address]  [address] [address]  Phone: [phone] Fax: [phone]    [id number] - [place], [place] - [address]  [address] [facility] [address]  Phone: [phone] Fax: [phone]      Initial Assessment (most recent)       Adult Discharge Assessment - 12/23/22 1433          Discharge Assessment    Assessment Type Discharge Planning Assessment     Confirmed/corrected address, phone number and insurance Yes     Confirmed Demographics Correct on Facesheet     Source of Information patient     If unable to respond/provide information was family/caregiver contacted? Yes     Contact Name/Number Jimena Brown [phone]     Does patient/caregiver understand observation status Yes     Communicated ALLY with patient/caregiver Date not available/Unable to determine     Reason For Admission amenia     People in Home alone     Facility Arrived From: home     Do you expect to return to your current living situation? Yes     Do you have help at home or someone to help you manage your care at home? Yes     Who are your caregiver(s) and their phone number(s)? Reema Nelson [phone]     Prior to hospitilization cognitive status: Alert/Oriented     Current cognitive status: Alert/Oriented      Home Accessibility wheelchair accessible     Home Layout Able to live on 1st floor     Equipment Currently Used at Home none     Readmission within 30 days? No     Patient currently being followed by outpatient case management? No     Do you currently have service(s) that help you manage your care at home? No     Do you take prescription medications? Yes     Do you have prescription coverage? Yes     Coverage humana     Do you have any problems affording any of your prescribed medications? TBD     Is the patient taking medications as prescribed? yes     Who is going to help you get home at discharge? Reema Nelson [phone]     How do you get to doctors appointments? family or friend will provide     Are you on dialysis? No     Do you take coumadin? No     Discharge Plan A Home with family     DME Needed Upon Discharge  none     Discharge Plan discussed with: Friend     Name(s) and Number(s) Reema Nelson [phone]     Discharge Barriers Identified None

## 2022-12-23 NOTE — ED NOTES
Called Arianne back to given report- spoke gaviota Berry- She is not at the desk again.  Informed  her and Jose Luis courtney on that I will bring pt and given bedside report.

## 2022-12-23 NOTE — ED NOTES
Assumed  care of pt.  Resting in recliner in room /  er dispostion pending.  Resps even/unlabored- voices no c/o.

## 2022-12-24 VITALS
WEIGHT: 138 LBS | BODY MASS INDEX: 25.4 KG/M2 | OXYGEN SATURATION: 98 % | RESPIRATION RATE: 18 BRPM | HEART RATE: 74 BPM | HEIGHT: 62 IN | TEMPERATURE: 99 F | DIASTOLIC BLOOD PRESSURE: 72 MMHG | SYSTOLIC BLOOD PRESSURE: 148 MMHG

## 2022-12-24 PROBLEM — D64.9 SYMPTOMATIC ANEMIA: Status: ACTIVE | Noted: 2022-12-24

## 2022-12-24 LAB
BASOPHILS # BLD AUTO: 0.03 X10(3)/MCL (ref 0–0.2)
BASOPHILS NFR BLD AUTO: 0.5 %
EOSINOPHIL # BLD AUTO: 0.33 X10(3)/MCL (ref 0–0.9)
EOSINOPHIL NFR BLD AUTO: 5.8 %
ERYTHROCYTE [DISTWIDTH] IN BLOOD BY AUTOMATED COUNT: 18.3 % (ref 11–14.5)
FOLATE SERPL-MCNC: 11.9 NG/ML (ref 7–31.4)
HCT VFR BLD AUTO: 26.1 % (ref 37–47)
HCT VFR BLD AUTO: 27.3 % (ref 37–47)
HGB BLD-MCNC: 8 GM/DL (ref 12–16)
HGB BLD-MCNC: 8.3 GM/DL (ref 12–16)
IMM GRANULOCYTES # BLD AUTO: 0 X10(3)/MCL (ref 0–0.04)
IMM GRANULOCYTES NFR BLD AUTO: 0 %
LYMPHOCYTES # BLD AUTO: 1.84 X10(3)/MCL (ref 0.6–4.6)
LYMPHOCYTES NFR BLD AUTO: 32.6 %
MCH RBC QN AUTO: 22.3 PG
MCHC RBC AUTO-ENTMCNC: 30.4 MG/DL (ref 33–36)
MCV RBC AUTO: 73.2 FL (ref 80–94)
MONOCYTES # BLD AUTO: 0.65 X10(3)/MCL (ref 0.1–1.3)
MONOCYTES NFR BLD AUTO: 11.5 %
NEUTROPHILS # BLD AUTO: 2.8 X10(3)/MCL (ref 2.1–9.2)
NEUTROPHILS NFR BLD AUTO: 49.6 %
PLATELET # BLD AUTO: 351 X10(3)/MCL (ref 140–371)
PMV BLD AUTO: 9.4 FL (ref 9.4–12.4)
RBC # BLD AUTO: 3.73 X10(6)/MCL (ref 4.2–5.4)
WBC # SPEC AUTO: 5.7 X10(3)/MCL (ref 4.5–11.5)

## 2022-12-24 PROCEDURE — G0378 HOSPITAL OBSERVATION PER HR: HCPCS

## 2022-12-24 PROCEDURE — 85025 COMPLETE CBC W/AUTO DIFF WBC: CPT | Performed by: GENERAL PRACTICE

## 2022-12-24 PROCEDURE — 36415 COLL VENOUS BLD VENIPUNCTURE: CPT | Performed by: GENERAL PRACTICE

## 2022-12-24 PROCEDURE — 82746 ASSAY OF FOLIC ACID SERUM: CPT | Performed by: GENERAL PRACTICE

## 2022-12-24 PROCEDURE — 85018 HEMOGLOBIN: CPT | Mod: 91 | Performed by: FAMILY MEDICINE

## 2022-12-24 RX ORDER — FERROUS SULFATE 325(65) MG
325 TABLET, DELAYED RELEASE (ENTERIC COATED) ORAL 2 TIMES DAILY
Qty: 30 TABLET | Refills: 2 | Status: ON HOLD | OUTPATIENT
Start: 2022-12-24 | End: 2023-01-22 | Stop reason: HOSPADM

## 2022-12-24 NOTE — PLAN OF CARE
Problem: Adult Inpatient Plan of Care  Goal: Plan of Care Review  Outcome: Ongoing, Progressing  Flowsheets (Taken 12/24/2022 0609)  Plan of Care Reviewed With:   patient   family  Goal: Patient-Specific Goal (Individualized)  Outcome: Ongoing, Progressing  Flowsheets (Taken 12/24/2022 0609)  Anxieties, Fears or Concerns: none  Individualized Care Needs: monitor blood levels  Patient-Specific Goals (Include Timeframe): return home by tommorrow if no complications  Goal: Absence of Hospital-Acquired Illness or Injury  Outcome: Ongoing, Progressing  Goal: Optimal Comfort and Wellbeing  Outcome: Ongoing, Progressing  Intervention: Monitor Pain and Promote Comfort  Flowsheets (Taken 12/24/2022 0609)  Pain Management Interventions:   medication offered   position adjusted   quiet environment facilitated   care clustered  Intervention: Provide Person-Centered Care  Flowsheets (Taken 12/24/2022 0609)  Trust Relationship/Rapport:   care explained   choices provided   emotional support provided   empathic listening provided   questions answered   questions encouraged   reassurance provided   thoughts/feelings acknowledged  Goal: Readiness for Transition of Care  Outcome: Ongoing, Progressing

## 2022-12-24 NOTE — DISCHARGE SUMMARY
Hospital Medicine  Discharge Summary    Patient Name: Crystal Lara  MRN: [id number]  Admit Date: 12/23/2022  Discharge Date:    Status: OP- Observation   Length of Stay: 0      PHYSICIANS   Admitting Physician: Dileep Alanis MD  Discharging Physician: Alberto Mario MD.  Primary Care Physician: CARLA Gandhi        DISCHARGE DIAGNOSES     Anemia  H/O iron deficiency   R/O GI Bleed  Arthritis   CAD  PVD  Clots?  HTN    PROCEDURES         HOSPITAL COURSE    67 y.o. female with a history that includes HTN, CAD, PVD, presented to ED with c/o anemia and weakness from PCP office. Found to have low H/H. Here in ER 6.8 & 23.4 on xarelto bid. Initial FOBT negative. Iron low at 15. Spoke with ERMD plan to transfuse.       H/H up and stable overnight  Feels good today  Stable for home d/c    Iron low will start po replacement     STATUS  Improved- Stable    DISPOSITION  Discharge to home     Follwup pcp next week    DIET  cardiac    ACTIVITY  As tolerated      FOLLOW-UP      Follow-up Information       CARLA Gandhi .    Specialty: Family Medicine  Why: Office is closed.  Contact information:  [address] [place] [address]  [phone]                               DISCHARGE MEDICATION RECONCILIATION        Medication List        START taking these medications      ferrous sulfate 325 (65 FE) MG EC tablet  Take 1 tablet (325 mg total) by mouth 2 (two) times daily.            CONTINUE taking these medications      amlodipine-benazepril 5-10 mg 5-10 mg per capsule  Commonly known as: LOTREL  Take 1 capsule by mouth Daily.     aspirin 81 MG EC tablet  Commonly known as: ECOTRIN     atorvastatin 40 MG tablet  Commonly known as: LIPITOR  Take 1 tablet (40 mg total) by mouth nightly.     cyclobenzaprine 10 MG tablet  Commonly known as: FLEXERIL     docusate sodium 100 mg capsule     esomeprazole magnesium 20 mg Tbec     ezetimibe 10 mg tablet  Commonly known as:  ZETIA  Take 1 tablet (10 mg total) by mouth Daily.     metoprolol tartrate 25 MG tablet  Commonly known as: LOPRESSOR  Take 0.5 tablets (12.5 mg total) by mouth 2 (two) times a day.     nicotine 14 mg/24 hr  Commonly known as: NICODERM CQ  Place 1 patch onto the skin once daily.     nicotine polacrilex 2 MG Lozg  Take 1 lozenge (2 mg total) by mouth as needed (smoking urge).     XARELTO 2.5 mg Tab  Generic drug: rivaroxaban  Take 1 tablet (2.5 mg total) by mouth 2 (two) times a day.               Where to Get Your Medications        These medications were sent to [id number] - [facility] [address] [facility]  [address] [facility] [address]      Phone: [phone]   ferrous sulfate 325 (65 FE) MG EC tablet           PHYSICAL EXAM   VITALS: T 98.5 °F (36.9 °C)   BP (!) 148/72   P 74   RR 18   O2 98 %    Physical Exam  Vitals and nursing note reviewed.   Constitutional:       Appearance: Normal appearance.   HENT:      Head: Normocephalic and atraumatic.   Cardiovascular:      Rate and Rhythm: Normal rate.      Heart sounds: Normal heart sounds.   Pulmonary:      Effort: Pulmonary effort is normal.      Breath sounds: Normal breath sounds.   Skin:     General: Skin is warm and dry.   Neurological:      General: No focal deficit present.      Mental Status: She is alert and oriented to person, place, and time.            Discharge time: 33 minutes     Alberto Mario MD  [facility]       DIAGNOSITCS   CBC:   Recent Labs   Lab 12/23/22  1022 12/24/22  0003 12/24/22  0538   WBC 6.2  --  5.7   HGB 6.8* 8.0* 8.3*   HCT 23.3* 26.1* 27.3*   *  --  351     COAG:  No results for input(s): APTT, INR, PTT in the last 168 hours.  CMP:   Recent Labs   Lab 12/23/22  1022   CALCIUM 8.6   ALBUMIN 3.2*   *   K 3.7   CO2 25   BUN 12.4   CREATININE 0.72   ALKPHOS 58   ALT 14   AST 19   BILITOT 0.6     Estimated Creatinine Clearance: 66 mL/min (based on SCr of 0.72  mg/dL).  CARDIAC ENZYMES: No results for input(s): TROPONINI, CPK, CKMB in the last 72 hours.     No results for input(s): AMYLASE, LIPASE in the last 168 hours.      No results for input(s): POCTGLUCOSE in the last 72 hours.     Microbiology Results (last 7 days)       ** No results found for the last 168 hours. **               No results found.

## 2022-12-24 NOTE — H&P
[facility]  History & Physical Examination       Patient: Crystal Lara  MRN: [id number]  STATUS: OP- Observation   DOS: 12/24/2022   PCP: CARLA Gandhi      CC: weakness, anemia        HISTORY OF PRESENT ILLNESS   67 y.o. female with a history that includes HTN, CAD, PVD, presented to ED with c/o anemia and weakness from PCP office. Found to have low H/H. Here in ER 6.8 & 23.4 on xarelto bid. Initial FOBT negative. Iron low at 15. Spoke with ERMD plan to transfuse.       REVIEW OF SYSTEMS   Except as documented, all other systems reviewed and negative       PAST MEDICAL HISTORY     Past Medical History:   Diagnosis Date    Coronary artery disease     Hyperlipidemia     Hypertension           PAST SURGICAL HISTORY     Past Surgical History:   Procedure Laterality Date    CARDIAC CATHETERIZATION      CORONARY ANGIOPLASTY            FAMILY HISTORY   Reviewed, negative in relation to patient's current condition.      SOCIAL HISTORY     Social History     Tobacco Use    Smoking status: Every Day     Packs/day: 0.50     Types: Cigarettes    Smokeless tobacco: Never   Substance Use Topics    Alcohol use: Not Currently          ALLERGIES   Naproxen      HOME MEDICATIONS     Current Outpatient Medications   Medication Instructions    amlodipine-benazepril 5-10 mg (LOTREL) 5-10 mg per capsule 1 capsule, Oral, Daily    aspirin (ECOTRIN) 81 MG EC tablet 1 tablet, Oral, Daily    atorvastatin (LIPITOR) 40 mg, Oral, Nightly    cyclobenzaprine (FLEXERIL) 10 mg, Oral, As needed (PRN)    docusate sodium 100 mg capsule 1 tablet, Oral, Every other day    esomeprazole magnesium 20 mg TbEC 1 capsule, Oral, Nightly    ezetimibe (ZETIA) 10 mg, Oral, Daily    metoprolol tartrate (LOPRESSOR) 12.5 mg, Oral, 2 times daily    nicotine (NICODERM CQ) 14 mg/24 hr 1 patch, Transdermal, Daily    nicotine polacrilex 2 mg, Oral, As needed (PRN)    XARELTO 2.5 mg, Oral, 2 times daily          PHYSICAL EXAM   VITALS: T 98.5 °F (36.9 °C)   BP  (!) 148/72   P 74   RR 18   O2 98 %    GENERAL: Awake and in NAD  HEENT: NC/AT, EOMI, PERRL.  NECK: Supple,  No JVD  LUNGS: CTA B/L  CVS: RRR, S1S2 positive  GI/: Soft, NT/ND, bowel sounds positive.  EXTREMITIES: Peripheral pulses 2+, no peripheral edema  DERM: Warm, dry.  No rashes or lesions noted.  NEURO: AAOx3, no focal neurologic deficit  PSYCH: Cooperative, appropriate mood and affect       DIAGNOSTICS     Recent Labs     12/23/22  1022 12/24/22  0003 12/24/22  0538   WBC 6.2  --  5.7   RBC 3.26*  --  3.73*   HGB 6.8* 8.0* 8.3*   HCT 23.3* 26.1* 27.3*   MCV 71.5*  --  73.2*   MCH 20.9  --  22.3   MCHC 29.2*  --  30.4*   RDW 18.0*  --  18.3*   *  --  351     No results for input(s): LACTIC in the last 72 hours.  No results for input(s): INR, APTT, D-DIMER in the last 72 hours.  No results for input(s): HGBA1C, CHOL, TRIG, LDL, VLDL, HDL in the last 72 hours.   Recent Labs     12/23/22  1022 12/23/22  1220   *  --    K 3.7  --    CHLORIDE 99  --    CO2 25  --    BUN 12.4  --    CREATININE 0.72  --    GLUCOSE 116*  --    CALCIUM 8.6  --    ALBUMIN 3.2*  --    GLOBULIN 3.9*  --    ALKPHOS 58  --    ALT 14  --    AST 19  --    BILITOT 0.6  --    FERRITIN  --  17.20   IRON  --  15*   TIBC  --  325     No results for input(s): BNP, CPK, TROPONINI in the last 72 hours.       DXA Bone Density Spine And Hip  Narrative: EXAMINATION:  DEXA BONE DENSITY SPINE HIP    CLINICAL HISTORY:  Unspecified menopausal and perimenopausal disorder    COMPARISON:  No reference studies are available for comparison.    FINDINGS:  BMD     T-score    0.910 -2.3.  Lumbar Spine (L1-L4)    0.623 -3.1.  Total Left Femur    0.674 -2.7.  Total Right Femur    0.648 -2.9.  Mean Femur  Impression: Osteoporosis based on the hips.  Significantly increased fracture risk.    Electronically signed by: Domenico Waters  Date:    08/24/2022  Time:    13:09        ASSESSMENT   Anemia  H/O iron deficiency   R/O GI Bleed  Arthritis    CAD  PVD  Clots?  HTN    PLAN   Admit to observation   Iron  Transfuse 1 U pRBC  Monitor H/H    Prophylaxis: lovenox held anemia   Code Status: full code      Alberto Mario MD  Hospital Medicine        If the patient has been admitted under observation status, it is at my discretion and under our care with hospital medicine services. [TWA]

## 2022-12-27 NOTE — PLAN OF CARE
[facility]  Discharge Final Note    Primary Care Provider: CARLA Gandhi    Expected Discharge Date: 12/24/2022    Final Discharge Note (most recent)       Final Note - 12/27/22 1226          Final Note    Assessment Type Final Discharge Note     Anticipated Discharge Disposition Home or Self Care     What phone number can be called within the next 1-3 days to see how you are doing after discharge? [phone]     Hospital Resources/Appts/Education Provided Provided patient/caregiver with written discharge plan information        Post-Acute Status    Discharge Delays None known at this time                     Important Message from Medicare             Contact Info       CARLA Gandhi   Specialty: Family Medicine   Relationship: PCP - General    [address] [place] [address]   Phone: [phone]       Next Steps: Follow up    Instructions: Office is closed.    CARLA Gandhi   Specialty: Family Medicine   Relationship: PCP - General    [address] [place] [address]   Phone: [phone]       Next Steps: Follow up    Instructions: Please contact your PCP on Tuesday, Dec 27, 2022 to schedule a follow up appointment.

## 2023-01-05 ENCOUNTER — OFFICE VISIT (OUTPATIENT)
Dept: CARDIOLOGY | Facility: CLINIC | Age: 68
End: 2023-01-05
Payer: MEDICARE

## 2023-01-05 VITALS
HEART RATE: 63 BPM | BODY MASS INDEX: 26.06 KG/M2 | TEMPERATURE: 98 F | SYSTOLIC BLOOD PRESSURE: 131 MMHG | OXYGEN SATURATION: 99 % | DIASTOLIC BLOOD PRESSURE: 65 MMHG | WEIGHT: 138 LBS | RESPIRATION RATE: 20 BRPM | HEIGHT: 61 IN

## 2023-01-05 DIAGNOSIS — E78.5 HYPERLIPIDEMIA LDL GOAL <70: ICD-10-CM

## 2023-01-05 DIAGNOSIS — Z72.0 TOBACCO USE: ICD-10-CM

## 2023-01-05 DIAGNOSIS — I73.9 PAD (PERIPHERAL ARTERY DISEASE): Primary | ICD-10-CM

## 2023-01-05 DIAGNOSIS — I10 HYPERTENSION, UNSPECIFIED TYPE: ICD-10-CM

## 2023-01-05 PROCEDURE — 3075F SYST BP GE 130 - 139MM HG: CPT | Mod: CPTII,,, | Performed by: NURSE PRACTITIONER

## 2023-01-05 PROCEDURE — 1160F PR REVIEW ALL MEDS BY PRESCRIBER/CLIN PHARMACIST DOCUMENTED: ICD-10-PCS | Mod: CPTII,,, | Performed by: NURSE PRACTITIONER

## 2023-01-05 PROCEDURE — 1101F PR PT FALLS ASSESS DOC 0-1 FALLS W/OUT INJ PAST YR: ICD-10-PCS | Mod: CPTII,,, | Performed by: NURSE PRACTITIONER

## 2023-01-05 PROCEDURE — 3008F PR BODY MASS INDEX (BMI) DOCUMENTED: ICD-10-PCS | Mod: CPTII,,, | Performed by: NURSE PRACTITIONER

## 2023-01-05 PROCEDURE — 99213 OFFICE O/P EST LOW 20 MIN: CPT | Mod: S$PBB,,, | Performed by: NURSE PRACTITIONER

## 2023-01-05 PROCEDURE — 1159F MED LIST DOCD IN RCRD: CPT | Mod: CPTII,,, | Performed by: NURSE PRACTITIONER

## 2023-01-05 PROCEDURE — 3075F PR MOST RECENT SYSTOLIC BLOOD PRESS GE 130-139MM HG: ICD-10-PCS | Mod: CPTII,,, | Performed by: NURSE PRACTITIONER

## 2023-01-05 PROCEDURE — 99214 OFFICE O/P EST MOD 30 MIN: CPT | Mod: PBBFAC | Performed by: NURSE PRACTITIONER

## 2023-01-05 PROCEDURE — 3008F BODY MASS INDEX DOCD: CPT | Mod: CPTII,,, | Performed by: NURSE PRACTITIONER

## 2023-01-05 PROCEDURE — 3288F PR FALLS RISK ASSESSMENT DOCUMENTED: ICD-10-PCS | Mod: CPTII,,, | Performed by: NURSE PRACTITIONER

## 2023-01-05 PROCEDURE — 1159F PR MEDICATION LIST DOCUMENTED IN MEDICAL RECORD: ICD-10-PCS | Mod: CPTII,,, | Performed by: NURSE PRACTITIONER

## 2023-01-05 PROCEDURE — 99213 PR OFFICE/OUTPT VISIT, EST, LEVL III, 20-29 MIN: ICD-10-PCS | Mod: S$PBB,,, | Performed by: NURSE PRACTITIONER

## 2023-01-05 PROCEDURE — 1160F RVW MEDS BY RX/DR IN RCRD: CPT | Mod: CPTII,,, | Performed by: NURSE PRACTITIONER

## 2023-01-05 PROCEDURE — 1101F PT FALLS ASSESS-DOCD LE1/YR: CPT | Mod: CPTII,,, | Performed by: NURSE PRACTITIONER

## 2023-01-05 PROCEDURE — 3078F DIAST BP <80 MM HG: CPT | Mod: CPTII,,, | Performed by: NURSE PRACTITIONER

## 2023-01-05 PROCEDURE — 3078F PR MOST RECENT DIASTOLIC BLOOD PRESSURE < 80 MM HG: ICD-10-PCS | Mod: CPTII,,, | Performed by: NURSE PRACTITIONER

## 2023-01-05 PROCEDURE — 3288F FALL RISK ASSESSMENT DOCD: CPT | Mod: CPTII,,, | Performed by: NURSE PRACTITIONER

## 2023-01-05 NOTE — PROGRESS NOTES
CHIEF COMPLAINT:   Chief Complaint   Patient presents with    6mt f/u with labs (she brought copy of results).     Pt had small GI bleed and received blood Dec 23.  She wants to know if xarelto is needed.                   Review of patient's allergies indicates:   Allergen Reactions    Naproxen Swelling                                          HPI:  Crystal Lara 67 y.o. female with a past medical history of CAD, PAD, HLD, and Tobacco Use presents for 6 month follow up and ongoing care.  Patient had a recent hospitalization in December 2022 for GI bleed and received 1 unit PRBCs.  Patient states she did experience some weakness and shortness of breath due to anemia which resolved after she received the blood transfusion.  She also denies chest pain, palpitations, orthopnea, PND, peripheral edema, or syncope.  She also denies any claudication symptoms.  She currently smokes approximately 5-6 cigarettes per day, but states she is trying to quit.  She reports compliance with her medications.  Patient continues to follow up with Dr. Alvarez for the PAD.        Background:  At office visit on 11/10/2021, patient's right lower extremity DP/PT was nondopplerable or palpable. Dr. Samson was contacted and was going to perform angiogram on 11/11/2021. Patient arrived at [facility] 11/11/2021 and was found to be anemic with hemoglobin of 6.8 and was transfused 2 units of PRBC. 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 Colonoscopy revealed small internal hemorrhoids and a few small diverticula in the sigmoid colon. Arterial ultrasound was performed which revealed severe decrease in arterial flow in the right lower extremity. Ultrasound was negative for DVT. 12/17/2021 common femoral endarterectomy was performed. Echo revealed LVEF 45%.                                                                                                                                                                                                  Patient Active Problem List   Diagnosis    PAD (peripheral artery disease)    Hyperlipidemia LDL goal <70    Hypertension    Smoking    GI bleed    Symptomatic anemia     Past Surgical History:   Procedure Laterality Date    CARDIAC CATHETERIZATION      CORONARY ANGIOPLASTY       Social History     Socioeconomic History    Marital status: Single   Tobacco Use    Smoking status: Every Day     Packs/day: 0.50     Types: Cigarettes    Smokeless tobacco: Never   Substance and Sexual Activity    Alcohol use: Not Currently    Drug use: Never        Family History   Problem Relation Age of Onset    Heart disease Mother     Heart attack Mother     Heart failure Father          Current Outpatient Medications:     amlodipine-benazepril 5-10 mg (LOTREL) 5-10 mg per capsule, Take 1 capsule by mouth Daily., Disp: 90 capsule, Rfl: 6    aspirin (ECOTRIN) 81 MG EC tablet, Take 1 tablet by mouth Daily., Disp: , Rfl:     atorvastatin (LIPITOR) 40 MG tablet, Take 1 tablet (40 mg total) by mouth nightly., Disp: 90 tablet, Rfl: 6    cyclobenzaprine (FLEXERIL) 10 MG tablet, Take 10 mg by mouth as needed., Disp: , Rfl:     docusate sodium 100 mg capsule, Take 1 tablet by mouth every other day., Disp: , Rfl:     esomeprazole magnesium 20 mg TbEC, Take 1 capsule by mouth nightly., Disp: , Rfl:     ezetimibe (ZETIA) 10 mg tablet, Take 1 tablet (10 mg total) by mouth Daily., Disp: 90 tablet, Rfl: 6    metoprolol tartrate (LOPRESSOR) 25 MG tablet, Take 0.5 tablets (12.5 mg total) by mouth 2 (two) times a day., Disp: 90 tablet, Rfl: 6    rivaroxaban (XARELTO) 2.5 mg Tab, Take 1 tablet (2.5 mg total) by mouth 2 (two) times a day., Disp: 90 tablet, Rfl: 6    ferrous sulfate 325 (65 FE) MG EC tablet, Take 1 tablet (325 mg total) by mouth 2 (two) times daily. (Patient not taking: Reported on 1/5/2023), Disp: 30 tablet, Rfl: 2    nicotine (NICODERM CQ) 14 mg/24 hr, Place 1 patch onto the skin once daily.  "(Patient not taking: Reported on 1/5/2023), Disp: 28 patch, Rfl: 2    nicotine polacrilex 2 MG Lozg, Take 1 lozenge (2 mg total) by mouth as needed (smoking urge). (Patient not taking: Reported on 1/5/2023), Disp: 108 lozenge, Rfl: 2     ROS:                                                                                                                                                                             Review of Systems   Constitutional: Negative.    Respiratory: Negative.     Cardiovascular: Negative.    Gastrointestinal: Negative.    Musculoskeletal: Negative.    Skin: Negative.    Neurological: Negative.    Psychiatric/Behavioral: Negative.        Blood pressure 131/65, pulse 63, temperature 98.1 °F (36.7 °C), resp. rate 20, height 5' 0.63" (1.54 m), weight 62.6 kg (138 lb), SpO2 99 %.   PE:  Physical Exam  Constitutional:       Appearance: Normal appearance.   HENT:      Head: Normocephalic and atraumatic.   Eyes:      Extraocular Movements: Extraocular movements intact.      Pupils: Pupils are equal, round, and reactive to light.   Cardiovascular:      Rate and Rhythm: Normal rate and regular rhythm.   Pulmonary:      Effort: Pulmonary effort is normal.      Breath sounds: Normal breath sounds.   Abdominal:      Palpations: Abdomen is soft.   Musculoskeletal:         General: Normal range of motion.      Cervical back: Normal range of motion.   Skin:     General: Skin is warm and dry.   Neurological:      General: No focal deficit present.      Mental Status: She is alert and oriented to person, place, and time.   Psychiatric:         Mood and Affect: Mood normal.         Behavior: Behavior normal.        ASSESSMENT/PLAN:  Nonobstructive CAD  -LHC performed 4/13/2016 revealed LAD 50% stenosis, left circumflex 30% proximal stenosis, RCA 40% stenosis with normal LVEF 60%. Admitted for medical management at the time and to consider FFR of LAD if patient develops recurrent angina.  - No cardiac " complaints  - Continue Aspirin, Atorvastatin, Benazepril, and Metoprolol Tartrate  - Counseled on heart healthy diet, exercise as tolerated, and smoking cessation    PAD  -11/11/2021 arterial ultrasound was performed which revealed severe decrease in arterial flow in the right lower extremity. Ultrasound was negative for DVT.   -12/17/2021 common femoral endarterectomy was performed. Echo revealed LVEF 45%.  -Continue aspirin 81 mg, Xarelto 2.5 mg twice daily, and Lipitor 40 mg  - Recent GI bleed December 2022- will defer recommendations on continuation of Xarelto to Dr. Alvarez who patient currently follows for the PAD    Hypertension  -BP at goal - 131/65  -Continue Metoprolol Tartrate and Lotrel  - Counseled on low-salt diet and exercise as tolerated    HLD  -LDL at goal per labs with PCP - 56 in July 2022   -Continue Lipitor 40 mg and Zetia 10mg  -Patient reports intolerance to lipitor 80mg in the past    Tobacco Use  -Patient currently smokes 5-6 cigarettes per day   -States she is trying to cut down and eventually quit  - Counseled the importance of smoking cessation    Recent GI Bleed in Dec. 2022  Hx of GI bleed in Dec. 2021 - at that time, pt underwent a colonoscopy and endoscopy that revealed a single recently bleeding angiectasia in the duodenum treated with argon plasma coagulation.      Follow up in Cardiology Clinic in 6 months   Follow-up with PCP and Dr. Alvarez as directed

## 2023-01-19 ENCOUNTER — HOSPITAL ENCOUNTER (INPATIENT)
Facility: HOSPITAL | Age: 68
LOS: 3 days | Discharge: HOME | DRG: 812 | End: 2023-01-22
Attending: EMERGENCY MEDICINE | Admitting: INTERNAL MEDICINE
Payer: MEDICARE

## 2023-01-19 DIAGNOSIS — D64.9 ANEMIA, UNSPECIFIED TYPE: ICD-10-CM

## 2023-01-19 DIAGNOSIS — R53.1 GENERAL WEAKNESS: ICD-10-CM

## 2023-01-19 DIAGNOSIS — R53.1 WEAKNESS: ICD-10-CM

## 2023-01-19 DIAGNOSIS — K92.2 GASTROINTESTINAL HEMORRHAGE, UNSPECIFIED GASTROINTESTINAL HEMORRHAGE TYPE: Primary | ICD-10-CM

## 2023-01-19 LAB
ABO + RH BLD: NORMAL
ALBUMIN SERPL-MCNC: 3.3 G/DL (ref 3.4–4.8)
ALBUMIN/GLOB SERPL: 0.8 RATIO (ref 1.1–2)
ALP SERPL-CCNC: 72 UNIT/L (ref 40–150)
ALT SERPL-CCNC: 12 UNIT/L (ref 0–55)
ANISOCYTOSIS BLD QL SMEAR: ABNORMAL
APPEARANCE UR: CLEAR
AST SERPL-CCNC: 20 UNIT/L (ref 5–34)
BACTERIA #/AREA URNS AUTO: NORMAL /HPF
BASOPHILS # BLD AUTO: 0.11 X10(3)/MCL (ref 0–0.2)
BASOPHILS NFR BLD AUTO: 1.5 %
BILIRUB UR QL STRIP.AUTO: NEGATIVE MG/DL
BILIRUBIN DIRECT+TOT PNL SERPL-MCNC: 0.6 MG/DL
BLD PROD TYP BPU: NORMAL
BLOOD UNIT EXPIRATION DATE: NORMAL
BLOOD UNIT TYPE CODE: 5100
BUN SERPL-MCNC: 15.8 MG/DL (ref 9.8–20.1)
CALCIUM SERPL-MCNC: 8.9 MG/DL (ref 8.4–10.2)
CHLORIDE SERPL-SCNC: 98 MMOL/L (ref 98–107)
CO2 SERPL-SCNC: 24 MMOL/L (ref 23–31)
COLOR UR AUTO: YELLOW
CREAT SERPL-MCNC: 0.94 MG/DL (ref 0.55–1.02)
CROSSMATCH INTERPRETATION: NORMAL
DISPENSE STATUS: NORMAL
EOSINOPHIL # BLD AUTO: 0.18 X10(3)/MCL (ref 0–0.9)
EOSINOPHIL NFR BLD AUTO: 2.4 %
ERYTHROCYTE [DISTWIDTH] IN BLOOD BY AUTOMATED COUNT: 20 % (ref 11.5–17)
GFR SERPLBLD CREATININE-BSD FMLA CKD-EPI: >60 MLS/MIN/1.73/M2
GLOBULIN SER-MCNC: 4.3 GM/DL (ref 2.4–3.5)
GLUCOSE SERPL-MCNC: 135 MG/DL (ref 82–115)
GLUCOSE UR QL STRIP.AUTO: NEGATIVE MG/DL
GROUP & RH: NORMAL
HCT VFR BLD AUTO: 23.6 % (ref 37–47)
HGB BLD-MCNC: 7.2 GM/DL (ref 12–16)
HYPOCHROMIA BLD QL SMEAR: ABNORMAL
IMM GRANULOCYTES # BLD AUTO: 0.02 X10(3)/MCL (ref 0–0.04)
IMM GRANULOCYTES NFR BLD AUTO: 0.3 %
INDIRECT COOMBS GEL: NORMAL
KETONES UR QL STRIP.AUTO: NEGATIVE MG/DL
LEUKOCYTE ESTERASE UR QL STRIP.AUTO: NEGATIVE UNIT/L
LYMPHOCYTES # BLD AUTO: 2.34 X10(3)/MCL (ref 0.6–4.6)
LYMPHOCYTES NFR BLD AUTO: 31.3 %
MCH RBC QN AUTO: 21.5 PG
MCHC RBC AUTO-ENTMCNC: 30.5 MG/DL (ref 33–36)
MCV RBC AUTO: 70.4 FL (ref 80–94)
MICROCYTES BLD QL SMEAR: ABNORMAL
MONOCYTES # BLD AUTO: 0.63 X10(3)/MCL (ref 0.1–1.3)
MONOCYTES NFR BLD AUTO: 8.4 %
NEUTROPHILS # BLD AUTO: 4.2 X10(3)/MCL (ref 2.1–9.2)
NEUTROPHILS NFR BLD AUTO: 56.1 %
NITRITE UR QL STRIP.AUTO: NEGATIVE
NRBC BLD AUTO-RTO: 0 %
PH UR STRIP.AUTO: 6 [PH]
PLATELET # BLD AUTO: 397 X10(3)/MCL (ref 130–400)
PLATELET # BLD EST: NORMAL 10*3/UL
PMV BLD AUTO: 9.5 FL (ref 7.4–10.4)
POIKILOCYTOSIS BLD QL SMEAR: ABNORMAL
POTASSIUM SERPL-SCNC: 3.9 MMOL/L (ref 3.5–5.1)
PROT SERPL-MCNC: 7.6 GM/DL (ref 5.8–7.6)
PROT UR QL STRIP.AUTO: NEGATIVE MG/DL
RBC # BLD AUTO: 3.35 X10(6)/MCL (ref 4.2–5.4)
RBC #/AREA URNS AUTO: NORMAL /HPF
RBC MORPH BLD: ABNORMAL
RBC UR QL AUTO: NEGATIVE UNIT/L
SODIUM SERPL-SCNC: 130 MMOL/L (ref 136–145)
SP GR UR STRIP.AUTO: 1.01 (ref 1–1.03)
SQUAMOUS #/AREA URNS AUTO: NORMAL /HPF
TARGETS BLD QL SMEAR: ABNORMAL
TROPONIN I SERPL-MCNC: <0.01 NG/ML (ref 0–0.04)
UNIT NUMBER: NORMAL
UROBILINOGEN UR STRIP-ACNC: 0.2 MG/DL
WBC # SPEC AUTO: 7.5 X10(3)/MCL (ref 4.5–11.5)
WBC #/AREA URNS AUTO: NORMAL /HPF

## 2023-01-19 PROCEDURE — 86900 BLOOD TYPING SEROLOGIC ABO: CPT | Performed by: PHYSICIAN ASSISTANT

## 2023-01-19 PROCEDURE — P9016 RBC LEUKOCYTES REDUCED: HCPCS | Performed by: STUDENT IN AN ORGANIZED HEALTH CARE EDUCATION/TRAINING PROGRAM

## 2023-01-19 PROCEDURE — 63600175 PHARM REV CODE 636 W HCPCS: Performed by: EMERGENCY MEDICINE

## 2023-01-19 PROCEDURE — C9113 INJ PANTOPRAZOLE SODIUM, VIA: HCPCS | Performed by: EMERGENCY MEDICINE

## 2023-01-19 PROCEDURE — 84484 ASSAY OF TROPONIN QUANT: CPT | Performed by: PHYSICIAN ASSISTANT

## 2023-01-19 PROCEDURE — 93010 EKG 12-LEAD: ICD-10-PCS | Mod: ,,, | Performed by: INTERNAL MEDICINE

## 2023-01-19 PROCEDURE — 81001 URINALYSIS AUTO W/SCOPE: CPT | Performed by: PHYSICIAN ASSISTANT

## 2023-01-19 PROCEDURE — 80053 COMPREHEN METABOLIC PANEL: CPT | Performed by: PHYSICIAN ASSISTANT

## 2023-01-19 PROCEDURE — 99285 EMERGENCY DEPT VISIT HI MDM: CPT | Mod: 25

## 2023-01-19 PROCEDURE — 86923 COMPATIBILITY TEST ELECTRIC: CPT | Mod: 91 | Performed by: STUDENT IN AN ORGANIZED HEALTH CARE EDUCATION/TRAINING PROGRAM

## 2023-01-19 PROCEDURE — 93005 ELECTROCARDIOGRAM TRACING: CPT

## 2023-01-19 PROCEDURE — 93010 ELECTROCARDIOGRAM REPORT: CPT | Mod: ,,, | Performed by: INTERNAL MEDICINE

## 2023-01-19 PROCEDURE — 86923 COMPATIBILITY TEST ELECTRIC: CPT | Mod: 91 | Performed by: EMERGENCY MEDICINE

## 2023-01-19 PROCEDURE — 36430 TRANSFUSION BLD/BLD COMPNT: CPT

## 2023-01-19 PROCEDURE — 85025 COMPLETE CBC W/AUTO DIFF WBC: CPT | Performed by: PHYSICIAN ASSISTANT

## 2023-01-19 PROCEDURE — 11000001 HC ACUTE MED/SURG PRIVATE ROOM

## 2023-01-19 RX ORDER — HYDROCODONE BITARTRATE AND ACETAMINOPHEN 500; 5 MG/1; MG/1
TABLET ORAL
Status: DISCONTINUED | OUTPATIENT
Start: 2023-01-19 | End: 2023-01-22 | Stop reason: HOSPADM

## 2023-01-19 RX ORDER — PANTOPRAZOLE SODIUM 40 MG/10ML
80 INJECTION, POWDER, LYOPHILIZED, FOR SOLUTION INTRAVENOUS
Status: COMPLETED | OUTPATIENT
Start: 2023-01-19 | End: 2023-01-19

## 2023-01-19 RX ADMIN — PANTOPRAZOLE SODIUM 80 MG: 40 INJECTION, POWDER, FOR SOLUTION INTRAVENOUS at 11:01

## 2023-01-19 NOTE — Clinical Note
Diagnosis: Gastrointestinal hemorrhage, unspecified gastrointestinal hemorrhage type [[id number]]   Admitting Provider:: BEAU RODNEY [[id number]]   Future Attending Provider: BEAU RODNEY [[id number]]   Reason for IP Medical Treatment  (Clinical interventions that can only be accomplished in the IP setting? ) :: gi bleed   Estimated Length of Stay:: 3-4 midnights   I certify that Inpatient services for greater than or equal to 2 midnights are medically necessary:: Yes   Plans for Post-Acute care--if anticipated (pick the single best option):: A. No post acute care anticipated at this time

## 2023-01-19 NOTE — FIRST PROVIDER EVALUATION
Medical screening examination initiated.  I have conducted a focused provider triage encounter, findings are as follows:    Brief history of present illness:  67-year-old female presents to ED for evaluation of low H&H.  Patient reports had outpatient labs and called to come into ED.  Last transfusion in December.  Patient denies any blood in stool. Currently on xarelto.  Vitals:    01/19/23 1609   BP: 102/67   Pulse: 97   Resp: 20   Temp: 98.1 °F (36.7 °C)   SpO2: 97%   Weight: 59 kg (130 lb)       Pertinent physical exam:  Patient is awake and alert and oriented.  Ambulatory to triage.  In no acute distress.      Brief workup plan:  Labs, EKG, type and screen, IVF    Preliminary workup initiated; this workup will be continued and followed by the physician or advanced practice provider that is assigned to the patient when roomed.

## 2023-01-20 ENCOUNTER — ANESTHESIA EVENT (OUTPATIENT)
Dept: ENDOSCOPY | Facility: HOSPITAL | Age: 68
DRG: 812 | End: 2023-01-20
Payer: MEDICARE

## 2023-01-20 ENCOUNTER — ANESTHESIA (OUTPATIENT)
Dept: ENDOSCOPY | Facility: HOSPITAL | Age: 68
DRG: 812 | End: 2023-01-20
Payer: MEDICARE

## 2023-01-20 LAB
ABO + RH BLD: NORMAL
ABO + RH BLD: NORMAL
ALBUMIN SERPL-MCNC: 3.4 G/DL (ref 3.4–4.8)
ALBUMIN/GLOB SERPL: 0.9 RATIO (ref 1.1–2)
ALP SERPL-CCNC: 71 UNIT/L (ref 40–150)
ALT SERPL-CCNC: 13 UNIT/L (ref 0–55)
AST SERPL-CCNC: 23 UNIT/L (ref 5–34)
BASOPHILS # BLD AUTO: 0.11 X10(3)/MCL (ref 0–0.2)
BASOPHILS NFR BLD AUTO: 1.3 %
BILIRUBIN DIRECT+TOT PNL SERPL-MCNC: 1 MG/DL
BLD PROD TYP BPU: NORMAL
BLD PROD TYP BPU: NORMAL
BLOOD UNIT EXPIRATION DATE: NORMAL
BLOOD UNIT EXPIRATION DATE: NORMAL
BLOOD UNIT TYPE CODE: 5100
BLOOD UNIT TYPE CODE: 5100
BUN SERPL-MCNC: 11.7 MG/DL (ref 9.8–20.1)
CALCIUM SERPL-MCNC: 8.9 MG/DL (ref 8.4–10.2)
CHLORIDE SERPL-SCNC: 99 MMOL/L (ref 98–107)
CO2 SERPL-SCNC: 24 MMOL/L (ref 23–31)
CREAT SERPL-MCNC: 0.75 MG/DL (ref 0.55–1.02)
CROSSMATCH INTERPRETATION: NORMAL
CROSSMATCH INTERPRETATION: NORMAL
DISPENSE STATUS: NORMAL
DISPENSE STATUS: NORMAL
EOSINOPHIL # BLD AUTO: 0.37 X10(3)/MCL (ref 0–0.9)
EOSINOPHIL NFR BLD AUTO: 4.4 %
ERYTHROCYTE [DISTWIDTH] IN BLOOD BY AUTOMATED COUNT: 20.9 % (ref 11.5–17)
FERRITIN SERPL-MCNC: 11.64 NG/ML (ref 4.63–204)
GFR SERPLBLD CREATININE-BSD FMLA CKD-EPI: >60 MLS/MIN/1.73/M2
GLOBULIN SER-MCNC: 3.9 GM/DL (ref 2.4–3.5)
GLUCOSE SERPL-MCNC: 96 MG/DL (ref 82–115)
HCT VFR BLD AUTO: 35.4 % (ref 37–47)
HGB BLD-MCNC: 11.1 GM/DL (ref 12–16)
IMM GRANULOCYTES # BLD AUTO: 0.03 X10(3)/MCL (ref 0–0.04)
IMM GRANULOCYTES NFR BLD AUTO: 0.4 %
IRON SATN MFR SERPL: 5 % (ref 20–50)
IRON SERPL-MCNC: 20 UG/DL (ref 50–170)
LYMPHOCYTES # BLD AUTO: 2.14 X10(3)/MCL (ref 0.6–4.6)
LYMPHOCYTES NFR BLD AUTO: 25.4 %
MCH RBC QN AUTO: 23.6 PG
MCHC RBC AUTO-ENTMCNC: 31.4 MG/DL (ref 33–36)
MCV RBC AUTO: 75.2 FL (ref 80–94)
MONOCYTES # BLD AUTO: 0.93 X10(3)/MCL (ref 0.1–1.3)
MONOCYTES NFR BLD AUTO: 11 %
NEUTROPHILS # BLD AUTO: 4.86 X10(3)/MCL (ref 2.1–9.2)
NEUTROPHILS NFR BLD AUTO: 57.5 %
NRBC BLD AUTO-RTO: 0 %
PLATELET # BLD AUTO: 316 X10(3)/MCL (ref 130–400)
PMV BLD AUTO: 8.9 FL (ref 7.4–10.4)
POTASSIUM SERPL-SCNC: 4 MMOL/L (ref 3.5–5.1)
PROT SERPL-MCNC: 7.3 GM/DL (ref 5.8–7.6)
RBC # BLD AUTO: 4.71 X10(6)/MCL (ref 4.2–5.4)
SODIUM SERPL-SCNC: 130 MMOL/L (ref 136–145)
TIBC SERPL-MCNC: 364 UG/DL (ref 70–310)
TIBC SERPL-MCNC: 384 UG/DL (ref 250–450)
UNIT NUMBER: NORMAL
UNIT NUMBER: NORMAL
WBC # SPEC AUTO: 8.4 X10(3)/MCL (ref 4.5–11.5)

## 2023-01-20 PROCEDURE — 91110 GI TRC IMG INTRAL ESOPH-ILE: CPT | Mod: TC | Performed by: INTERNAL MEDICINE

## 2023-01-20 PROCEDURE — 63600175 PHARM REV CODE 636 W HCPCS: Performed by: INTERNAL MEDICINE

## 2023-01-20 PROCEDURE — 27201423 OPTIME MED/SURG SUP & DEVICES STERILE SUPPLY: Performed by: INTERNAL MEDICINE

## 2023-01-20 PROCEDURE — 83550 IRON BINDING TEST: CPT | Performed by: INTERNAL MEDICINE

## 2023-01-20 PROCEDURE — 25000003 PHARM REV CODE 250: Performed by: NURSE ANESTHETIST, CERTIFIED REGISTERED

## 2023-01-20 PROCEDURE — C9113 INJ PANTOPRAZOLE SODIUM, VIA: HCPCS | Performed by: INTERNAL MEDICINE

## 2023-01-20 PROCEDURE — 11000001 HC ACUTE MED/SURG PRIVATE ROOM

## 2023-01-20 PROCEDURE — 82728 ASSAY OF FERRITIN: CPT | Performed by: INTERNAL MEDICINE

## 2023-01-20 PROCEDURE — 37000008 HC ANESTHESIA 1ST 15 MINUTES: Performed by: INTERNAL MEDICINE

## 2023-01-20 PROCEDURE — 80053 COMPREHEN METABOLIC PANEL: CPT | Performed by: INTERNAL MEDICINE

## 2023-01-20 PROCEDURE — 25000003 PHARM REV CODE 250: Performed by: INTERNAL MEDICINE

## 2023-01-20 PROCEDURE — 37000009 HC ANESTHESIA EA ADD 15 MINS: Performed by: INTERNAL MEDICINE

## 2023-01-20 PROCEDURE — 85025 COMPLETE CBC W/AUTO DIFF WBC: CPT | Performed by: INTERNAL MEDICINE

## 2023-01-20 PROCEDURE — 43235 EGD DIAGNOSTIC BRUSH WASH: CPT | Performed by: INTERNAL MEDICINE

## 2023-01-20 PROCEDURE — P9016 RBC LEUKOCYTES REDUCED: HCPCS | Performed by: EMERGENCY MEDICINE

## 2023-01-20 RX ORDER — PHENYLEPHRINE HYDROCHLORIDE 10 MG/ML
INJECTION INTRAVENOUS
Status: DISPENSED
Start: 2023-01-20 | End: 2023-01-20

## 2023-01-20 RX ORDER — SODIUM CHLORIDE, SODIUM GLUCONATE, SODIUM ACETATE, POTASSIUM CHLORIDE AND MAGNESIUM CHLORIDE 30; 37; 368; 526; 502 MG/100ML; MG/100ML; MG/100ML; MG/100ML; MG/100ML
INJECTION, SOLUTION INTRAVENOUS CONTINUOUS
Status: CANCELLED | OUTPATIENT
Start: 2023-01-20 | End: 2023-02-19

## 2023-01-20 RX ORDER — TALC
6 POWDER (GRAM) TOPICAL NIGHTLY PRN
Status: DISCONTINUED | OUTPATIENT
Start: 2023-01-20 | End: 2023-01-22 | Stop reason: HOSPADM

## 2023-01-20 RX ORDER — AMLODIPINE AND BENAZEPRIL HYDROCHLORIDE 5; 10 MG/1; MG/1
1 CAPSULE ORAL DAILY
Status: DISCONTINUED | OUTPATIENT
Start: 2023-01-20 | End: 2023-01-21

## 2023-01-20 RX ORDER — CYCLOBENZAPRINE HCL 5 MG
5 TABLET ORAL 3 TIMES DAILY PRN
Status: DISCONTINUED | OUTPATIENT
Start: 2023-01-20 | End: 2023-01-22 | Stop reason: HOSPADM

## 2023-01-20 RX ORDER — ONDANSETRON 4 MG/1
8 TABLET, ORALLY DISINTEGRATING ORAL EVERY 6 HOURS PRN
Status: CANCELLED | OUTPATIENT
Start: 2023-01-20

## 2023-01-20 RX ORDER — GLYCOPYRROLATE 0.2 MG/ML
INJECTION INTRAMUSCULAR; INTRAVENOUS
Status: DISCONTINUED | OUTPATIENT
Start: 2023-01-20 | End: 2023-01-20

## 2023-01-20 RX ORDER — LIDOCAINE HYDROCHLORIDE 10 MG/ML
1 INJECTION, SOLUTION EPIDURAL; INFILTRATION; INTRACAUDAL; PERINEURAL ONCE
Status: CANCELLED | OUTPATIENT
Start: 2023-01-20 | End: 2023-01-20

## 2023-01-20 RX ORDER — LIDOCAINE HCL/PF 100 MG/5ML
SYRINGE (ML) INTRAVENOUS
Status: DISCONTINUED | OUTPATIENT
Start: 2023-01-20 | End: 2023-01-20

## 2023-01-20 RX ORDER — EZETIMIBE 10 MG/1
10 TABLET ORAL DAILY
Status: DISCONTINUED | OUTPATIENT
Start: 2023-01-20 | End: 2023-01-22 | Stop reason: HOSPADM

## 2023-01-20 RX ORDER — ONDANSETRON 2 MG/ML
4 INJECTION INTRAMUSCULAR; INTRAVENOUS DAILY PRN
Status: CANCELLED | OUTPATIENT
Start: 2023-01-20

## 2023-01-20 RX ORDER — METOPROLOL TARTRATE 25 MG/1
12.5 TABLET ORAL 2 TIMES DAILY
Status: DISCONTINUED | OUTPATIENT
Start: 2023-01-20 | End: 2023-01-22 | Stop reason: HOSPADM

## 2023-01-20 RX ORDER — PANTOPRAZOLE SODIUM 40 MG/10ML
40 INJECTION, POWDER, LYOPHILIZED, FOR SOLUTION INTRAVENOUS 2 TIMES DAILY
Status: DISCONTINUED | OUTPATIENT
Start: 2023-01-20 | End: 2023-01-22 | Stop reason: HOSPADM

## 2023-01-20 RX ORDER — ATORVASTATIN CALCIUM 40 MG/1
40 TABLET, FILM COATED ORAL NIGHTLY
Status: DISCONTINUED | OUTPATIENT
Start: 2023-01-20 | End: 2023-01-22 | Stop reason: HOSPADM

## 2023-01-20 RX ORDER — SODIUM CHLORIDE 0.9 % (FLUSH) 0.9 %
10 SYRINGE (ML) INJECTION
Status: DISCONTINUED | OUTPATIENT
Start: 2023-01-20 | End: 2023-01-22 | Stop reason: HOSPADM

## 2023-01-20 RX ORDER — MEPERIDINE HYDROCHLORIDE 25 MG/ML
12.5 INJECTION INTRAMUSCULAR; INTRAVENOUS; SUBCUTANEOUS EVERY 10 MIN PRN
Status: CANCELLED | OUTPATIENT
Start: 2023-01-20 | End: 2023-01-21

## 2023-01-20 RX ORDER — HYDROXYZINE HYDROCHLORIDE 25 MG/1
25 TABLET, FILM COATED ORAL 3 TIMES DAILY PRN
Status: DISCONTINUED | OUTPATIENT
Start: 2023-01-20 | End: 2023-01-22 | Stop reason: HOSPADM

## 2023-01-20 RX ORDER — PROPOFOL 10 MG/ML
INJECTION, EMULSION INTRAVENOUS CONTINUOUS PRN
Status: DISCONTINUED | OUTPATIENT
Start: 2023-01-20 | End: 2023-01-20

## 2023-01-20 RX ORDER — PROCHLORPERAZINE EDISYLATE 5 MG/ML
5 INJECTION INTRAMUSCULAR; INTRAVENOUS EVERY 30 MIN PRN
Status: CANCELLED | OUTPATIENT
Start: 2023-01-20

## 2023-01-20 RX ORDER — IPRATROPIUM BROMIDE AND ALBUTEROL SULFATE 2.5; .5 MG/3ML; MG/3ML
3 SOLUTION RESPIRATORY (INHALATION)
Status: CANCELLED | OUTPATIENT
Start: 2023-01-20

## 2023-01-20 RX ORDER — HYDRALAZINE HYDROCHLORIDE 20 MG/ML
20 INJECTION INTRAMUSCULAR; INTRAVENOUS EVERY 4 HOURS PRN
Status: DISCONTINUED | OUTPATIENT
Start: 2023-01-20 | End: 2023-01-22 | Stop reason: HOSPADM

## 2023-01-20 RX ADMIN — HYDROXYZINE HYDROCHLORIDE 25 MG: 25 TABLET, FILM COATED ORAL at 12:01

## 2023-01-20 RX ADMIN — GLYCOPYRROLATE 0.2 MG: 0.2 INJECTION INTRAMUSCULAR; INTRAVENOUS at 10:01

## 2023-01-20 RX ADMIN — SODIUM CHLORIDE 125 MG: 9 INJECTION, SOLUTION INTRAVENOUS at 09:01

## 2023-01-20 RX ADMIN — LIDOCAINE HYDROCHLORIDE 50 MG: 20 INJECTION, SOLUTION INTRAVENOUS at 10:01

## 2023-01-20 RX ADMIN — AMLODIPINE BESYLATE AND BENAZEPRIL HYDROCHLORIDE 1 CAPSULE: 5; 10 CAPSULE ORAL at 05:01

## 2023-01-20 RX ADMIN — EZETIMIBE 10 MG: 10 TABLET ORAL at 05:01

## 2023-01-20 RX ADMIN — ATORVASTATIN CALCIUM 40 MG: 40 TABLET, FILM COATED ORAL at 09:01

## 2023-01-20 RX ADMIN — SODIUM CHLORIDE, SODIUM GLUCONATE, SODIUM ACETATE, POTASSIUM CHLORIDE AND MAGNESIUM CHLORIDE: 526; 502; 368; 37; 30 INJECTION, SOLUTION INTRAVENOUS at 10:01

## 2023-01-20 RX ADMIN — PROPOFOL 30 MCG/KG/MIN: 10 INJECTION, EMULSION INTRAVENOUS at 10:01

## 2023-01-20 RX ADMIN — PANTOPRAZOLE SODIUM 40 MG: 40 INJECTION, POWDER, FOR SOLUTION INTRAVENOUS at 09:01

## 2023-01-20 RX ADMIN — METOPROLOL TARTRATE 12.5 MG: 25 TABLET, FILM COATED ORAL at 02:01

## 2023-01-20 RX ADMIN — METOPROLOL TARTRATE 12.5 MG: 25 TABLET, FILM COATED ORAL at 09:01

## 2023-01-20 RX ADMIN — ATORVASTATIN CALCIUM 40 MG: 40 TABLET, FILM COATED ORAL at 02:01

## 2023-01-20 NOTE — ANESTHESIA RELEASE NOTE
Anesthesia Release from PACU Note    Patient: Crystal Lara    Procedure(s) Performed: Procedure(s) (LRB):  EGD/VCE placement (N/A)  CAPSULE ENDOSCOPY, ESOPHAGUS THROUGH ILEUM (N/A)    Anesthesia type: MAC    Post pain: Adequate analgesia    Post assessment: no apparent anesthetic complications, tolerated procedure well and no evidence of recall    Last Vitals:   Visit Vitals  BP (!) 172/83 (BP Location: Right arm, Patient Position: Sitting)   Pulse 65   Temp 36.3 °C (97.3 °F) (Temporal)   Resp (!) 21   Wt 59 kg (130 lb)   SpO2 (!) 93%   Breastfeeding No   BMI 24.86 kg/m²       Post vital signs: stable    Level of consciousness: awake, alert  and responds to stimulation    Nausea/Vomiting: no nausea/no vomiting    Complications: none    Airway Patency: patent    Respiratory: unassisted, spontaneous ventilation, room air    Cardiovascular: stable and blood pressure at baseline    Hydration: euvolemic

## 2023-01-20 NOTE — CONSULTS
"The documentation recorded by the scribe/NP/PA accurately reflects the service I personally performed and the decisions made by me.       Jose Luis Rodriguez MD  [facility]      Consult Note    Reason for Consult:      We were consulted by Dr. Chen to evaluate this patient for anemia, FOBT+.    HPI:     67-year-old  female known to Dr. Adams from previous hospital admission.  Patient with a PMH of CAD on ASA, HTN, HLD, PVD with stents and she says she is on Xarelto for this, anemia, duodenal AVM, sigmoid diverticulosis, hemorrhoids.  Patient presents to the ED last night with anemia on outpatient labs drawn by PCP that were drawn for weakness and "feeling drained."  No CP, SOB, palpitations.  Denies melena or hematochezia.  No abdominal pain, n/v.  No NSAIDs.     On presentation, VSS.  Laboratory notable for microcytic anemia with hemoglobin 7.2.  Patient was admitted and transfused 2 PRBCs.  Today, hemoglobin is 11.1.  Iron panel was checked today after transfusion:  Iron low 20,% sat low 5, TIBC normal 384, ferritin on the low end of normal 11.64.  ED rectal exam noted yellowish brown stool, Hemoccult positive.  Patient was admitted.  GI was consulted.    Patient was seen in November 2021 with anemia with Hgb as low as 6.8.  EGD found normal esophagus, stomach, 1 recently bleeding angioectasias in the duodenum treated with APC.  Colonoscopy: Mild sigmoid diverticulosis and small internal hemorrhoids otherwise normal exam into the ileum.    I see that in 12/23/2022 her hemoglobin was 6.8 again and she was transfused 1 unit PRBCs with improvement hemoglobin 8.    Previous records reviewed as above. Collateral information obtained from family member present at bedside.    PCP:  CARLA Gandhi    Review of patient's allergies indicates:   Allergen Reactions    Naproxen Swelling        Current Facility-Administered Medications   Medication Dose Route Frequency Provider Last Rate Last Admin    " 0.9%  NaCl infusion (for blood administration)   Intravenous Q24H PRN Kj Wooten MD        0.9%  NaCl infusion (for blood administration)   Intravenous Q24H PRN Kj Wooten MD        amlodipine-benazepril 5-10 mg per capsule 1 capsule  1 capsule Oral Daily Michael Chen MD        atorvastatin tablet 40 mg  40 mg Oral Nightly Michael Chen MD   40 mg at 01/20/23 0210    cyclobenzaprine tablet 5 mg  5 mg Oral TID PRN Michael Chen MD        ezetimibe tablet 10 mg  10 mg Oral Daily Michael Chen MD        ferric gluconate (FERRLECIT) 125 mg in sodium chloride 0.9% 100 mL IVPB  125 mg Intravenous Daily Michael Chen MD        hydrOXYzine HCL tablet 25 mg  25 mg Oral TID PRN Michael Chen MD   25 mg at 01/20/23 0053    melatonin tablet 6 mg  6 mg Oral Nightly PRN Michael Chen MD        metoprolol tartrate (LOPRESSOR) split tablet 12.5 mg  12.5 mg Oral BID Michael Chen MD   12.5 mg at 01/20/23 0210    sodium chloride 0.9% flush 10 mL  10 mL Intravenous PRN Michael Chen MD         Current Outpatient Medications   Medication Sig Dispense Refill    amlodipine-benazepril 5-10 mg (LOTREL) 5-10 mg per capsule Take 1 capsule by mouth Daily. 90 capsule 6    aspirin (ECOTRIN) 81 MG EC tablet Take 1 tablet by mouth Daily.      atorvastatin (LIPITOR) 40 MG tablet Take 1 tablet (40 mg total) by mouth nightly. 90 tablet 6    cyclobenzaprine (FLEXERIL) 10 MG tablet Take 10 mg by mouth as needed.      esomeprazole magnesium 20 mg TbEC Take 1 capsule by mouth nightly.      ezetimibe (ZETIA) 10 mg tablet Take 1 tablet (10 mg total) by mouth Daily. 90 tablet 6    metoprolol tartrate (LOPRESSOR) 25 MG tablet Take 0.5 tablets (12.5 mg total) by mouth 2 (two) times a day. 90 tablet 6    rivaroxaban (XARELTO) 2.5 mg Tab Take 1 tablet (2.5 mg total) by mouth 2 (two) times a day. 90 tablet 6    docusate sodium 100 mg capsule Take 1 tablet by mouth every other  day.      ferrous sulfate 325 (65 FE) MG EC tablet Take 1 tablet (325 mg total) by mouth 2 (two) times daily. (Patient not taking: Reported on 1/5/2023) 30 tablet 2    nicotine (NICODERM CQ) 14 mg/24 hr Place 1 patch onto the skin once daily. (Patient not taking: Reported on 1/5/2023) 28 patch 2    nicotine polacrilex 2 MG Lozg Take 1 lozenge (2 mg total) by mouth as needed (smoking urge). (Patient not taking: Reported on 1/5/2023) 108 lozenge 2     (Not in a hospital admission)      Past Medical History:  Past Medical History:   Diagnosis Date    Coronary artery disease     Hyperlipidemia     Hypertension       Past Surgical History:  Past Surgical History:   Procedure Laterality Date    CARDIAC CATHETERIZATION      CORONARY ANGIOPLASTY        Family History:  Family History   Problem Relation Age of Onset    Heart disease Mother     Heart attack Mother     Heart failure Father      Social History:  Social History     Tobacco Use    Smoking status: Every Day     Packs/day: 0.50     Types: Cigarettes    Smokeless tobacco: Never   Substance Use Topics    Alcohol use: Not Currently       Review of Systems:     Review of Systems   Constitutional:  Negative for appetite change, chills, diaphoresis, fatigue, fever and unexpected weight change.   HENT:  Negative for trouble swallowing.    Respiratory:  Negative for cough, chest tightness and shortness of breath.    Cardiovascular:  Negative for chest pain, palpitations and leg swelling.   Gastrointestinal:  Positive for blood in stool (occult only). Negative for abdominal distention, abdominal pain, constipation, diarrhea, nausea, rectal pain and vomiting.   Skin:  Negative for color change and pallor.   Neurological:  Positive for weakness. Negative for dizziness and light-headedness.   Psychiatric/Behavioral:  Negative for confusion.      Objective:     VITAL SIGNS: 24 HR MIN & MAX LAST    Temp  Min: 97.6 °F (36.4 °C)  Max: 98.1 °F (36.7 °C)  97.6 °F (36.4 °C)        BP   Min: 102/67  Max: 195/86  (!) 170/87     Pulse  Min: 55  Max: 97  (!) 57     Resp  Min: 16  Max: 23  16    SpO2  Min: 94 %  Max: 100 %  (!) 94 %        Intake/Output Summary (Last 24 hours) at 1/20/2023 0838  Last data filed at 1/20/2023 0631  Gross per 24 hour   Intake --   Output 1000 ml   Net -1000 ml       Physical Exam  Constitutional:       General: She is not in acute distress.     Appearance: She is not ill-appearing.   HENT:      Head: Normocephalic and atraumatic.   Eyes:      General: No scleral icterus.     Extraocular Movements: Extraocular movements intact.   Cardiovascular:      Rate and Rhythm: Normal rate and regular rhythm.   Pulmonary:      Effort: Pulmonary effort is normal. No respiratory distress.   Abdominal:      General: Bowel sounds are normal. There is no distension.      Palpations: Abdomen is soft. There is no mass.      Tenderness: There is no abdominal tenderness. There is no guarding or rebound.   Musculoskeletal:         General: Normal range of motion.      Right lower leg: No edema.      Left lower leg: No edema.   Skin:     General: Skin is warm and dry.      Coloration: Skin is not jaundiced or pale.   Neurological:      Mental Status: She is alert and oriented to person, place, and time.   Psychiatric:         Mood and Affect: Mood and affect normal.         Recent Results (from the past 48 hour(s))   Comprehensive metabolic panel    Collection Time: 01/19/23  4:45 PM   Result Value Ref Range    Sodium Level 130 (L) 136 - 145 mmol/L    Potassium Level 3.9 3.5 - 5.1 mmol/L    Chloride 98 98 - 107 mmol/L    Carbon Dioxide 24 23 - 31 mmol/L    Glucose Level 135 (H) 82 - 115 mg/dL    Blood Urea Nitrogen 15.8 9.8 - 20.1 mg/dL    Creatinine 0.94 0.55 - 1.02 mg/dL    Calcium Level Total 8.9 8.4 - 10.2 mg/dL    Protein Total 7.6 5.8 - 7.6 gm/dL    Albumin Level 3.3 (L) 3.4 - 4.8 g/dL    Globulin 4.3 (H) 2.4 - 3.5 gm/dL    Albumin/Globulin Ratio 0.8 (L) 1.1 - 2.0 ratio    Bilirubin  Total 0.6 <=1.5 mg/dL    Alkaline Phosphatase 72 40 - 150 unit/L    Alanine Aminotransferase 12 0 - 55 unit/L    Aspartate Aminotransferase 20 5 - 34 unit/L    eGFR >60 mls/min/1.73/m2   Type & Screen    Collection Time: 01/19/23  4:45 PM   Result Value Ref Range    Group & Rh O POS     Indirect Alexey GEL NEG    Troponin I    Collection Time: 01/19/23  4:45 PM   Result Value Ref Range    Troponin-I <0.010 0.000 - 0.045 ng/mL   Prepare RBC 1 Unit    Collection Time: 01/19/23  4:45 PM   Result Value Ref Range    UNIT NUMBER [id number]     UNIT ABO/RH O POS     DISPENSE STATUS Transfused     Unit Expiration [id number]     Product Code T3902N95     Unit Blood Type Code 5100     CROSSMATCH INTERPRETATION Compatible    Prepare RBC 2 Units; transfusion    Collection Time: 01/19/23  4:45 PM   Result Value Ref Range    UNIT NUMBER [id number]     UNIT ABO/RH O POS     DISPENSE STATUS Issued     Unit Expiration [id number]     Product Code [id number]     Unit Blood Type Code 5100     CROSSMATCH INTERPRETATION Compatible     UNIT NUMBER [id number]     UNIT ABO/RH O POS     DISPENSE STATUS Released     Unit Expiration [id number]     Product Code [id number]     Unit Blood Type Code 5100     CROSSMATCH INTERPRETATION Compatible    CBC with Differential    Collection Time: 01/19/23  4:46 PM   Result Value Ref Range    WBC 7.5 4.5 - 11.5 x10(3)/mcL    RBC 3.35 (L) 4.20 - 5.40 x10(6)/mcL    Hgb 7.2 (L) 12.0 - 16.0 gm/dL    Hct 23.6 (L) 37.0 - 47.0 %    MCV 70.4 (L) 80.0 - 94.0 fL    MCH 21.5 pg    MCHC 30.5 (L) 33.0 - 36.0 mg/dL    RDW 20.0 (H) 11.5 - 17.0 %    Platelet 397 130 - 400 x10(3)/mcL    MPV 9.5 7.4 - 10.4 fL    Neut % 56.1 %    Lymph % 31.3 %    Mono % 8.4 %    Eos % 2.4 %    Basophil % 1.5 %    Lymph # 2.34 0.6 - 4.6 x10(3)/mcL    Neut # 4.20 2.1 - 9.2 x10(3)/mcL    Mono # 0.63 0.1 - 1.3 x10(3)/mcL    Eos # 0.18 0 - 0.9 x10(3)/mcL    Baso # 0.11 0 - 0.2 x10(3)/mcL    IG# 0.02 0 - 0.04 x10(3)/mcL    IG% 0.3 %     NRBC% 0.0 %   Blood Smear Microscopic Exam    Collection Time: 01/19/23  4:46 PM   Result Value Ref Range    RBC Morph Abnormal (A) Normal    Anisocyte 1+ (A) (none)    Hypochrom 2+ (A) (none)    Microcyte 1+ (A) (none)    Poik 1+ (A) (none)    Target Cell 1+ (A) (none)    Platelet Est Normal Normal, Adequate   Urinalysis, Reflex to Urine Culture    Collection Time: 01/19/23  8:44 PM    Specimen: Urine   Result Value Ref Range    Color, UA Yellow Yellow, Light-Yellow, Dark Yellow, Magi, Straw    Appearance, UA Clear Clear    Specific Gravity, UA 1.008 1.001 - 1.030    pH, UA 6.0 5.0 - 8.5    Protein, UA Negative Negative mg/dL    Glucose, UA Negative Negative, Normal mg/dL    Ketones, UA Negative Negative mg/dL    Blood, UA Negative Negative unit/L    Bilirubin, UA Negative Negative mg/dL    Urobilinogen, UA 0.2 0.2, 1.0, Normal mg/dL    Nitrites, UA Negative Negative    Leukocyte Esterase, UA Negative Negative unit/L   Urinalysis, Microscopic    Collection Time: 01/19/23  8:44 PM   Result Value Ref Range    RBC, UA None Seen None Seen, 0-2, 3-5, 0-5 /HPF    WBC, UA 0-2 None Seen, 0-2, 3-5, 0-5 /HPF    Squamous Epithelial Cells, UA 0-4 0-4, None Seen /HPF    Bacteria, UA None Seen None Seen, Rare, Occasional /HPF   Ferritin    Collection Time: 01/20/23  4:14 AM   Result Value Ref Range    Ferritin Level 11.64 4.63 - 204.00 ng/mL   Iron and TIBC    Collection Time: 01/20/23  4:14 AM   Result Value Ref Range    Iron Binding Capacity Unsaturated 364 (H) 70 - 310 ug/dL    Iron Level 20 (L) 50 - 170 ug/dL    Iron Binding Capacity Total 384 250 - 450 ug/dL    Iron Saturation 5 (L) 20 - 50 %   Comprehensive metabolic panel    Collection Time: 01/20/23  4:14 AM   Result Value Ref Range    Sodium Level 130 (L) 136 - 145 mmol/L    Potassium Level 4.0 3.5 - 5.1 mmol/L    Chloride 99 98 - 107 mmol/L    Carbon Dioxide 24 23 - 31 mmol/L    Glucose Level 96 82 - 115 mg/dL    Blood Urea Nitrogen 11.7 9.8 - 20.1 mg/dL    Creatinine  0.75 0.55 - 1.02 mg/dL    Calcium Level Total 8.9 8.4 - 10.2 mg/dL    Protein Total 7.3 5.8 - 7.6 gm/dL    Albumin Level 3.4 3.4 - 4.8 g/dL    Globulin 3.9 (H) 2.4 - 3.5 gm/dL    Albumin/Globulin Ratio 0.9 (L) 1.1 - 2.0 ratio    Bilirubin Total 1.0 <=1.5 mg/dL    Alkaline Phosphatase 71 40 - 150 unit/L    Alanine Aminotransferase 13 0 - 55 unit/L    Aspartate Aminotransferase 23 5 - 34 unit/L    eGFR >60 mls/min/1.73/m2   CBC with Differential    Collection Time: 01/20/23  4:14 AM   Result Value Ref Range    WBC 8.4 4.5 - 11.5 x10(3)/mcL    RBC 4.71 4.20 - 5.40 x10(6)/mcL    Hgb 11.1 (L) 12.0 - 16.0 gm/dL    Hct 35.4 (L) 37.0 - 47.0 %    MCV 75.2 (L) 80.0 - 94.0 fL    MCH 23.6 pg    MCHC 31.4 (L) 33.0 - 36.0 mg/dL    RDW 20.9 (H) 11.5 - 17.0 %    Platelet 316 130 - 400 x10(3)/mcL    MPV 8.9 7.4 - 10.4 fL    Neut % 57.5 %    Lymph % 25.4 %    Mono % 11.0 %    Eos % 4.4 %    Basophil % 1.3 %    Lymph # 2.14 0.6 - 4.6 x10(3)/mcL    Neut # 4.86 2.1 - 9.2 x10(3)/mcL    Mono # 0.93 0.1 - 1.3 x10(3)/mcL    Eos # 0.37 0 - 0.9 x10(3)/mcL    Baso # 0.11 0 - 0.2 x10(3)/mcL    IG# 0.03 0 - 0.04 x10(3)/mcL    IG% 0.4 %    NRBC% 0.0 %       No results found.    Assessment / Plan:     66 yo CF known to Dr. Adams from previous hospital admission.  Patient with a PMH of CAD on ASA, HTN, HLD, PVD with stents and she says she is on Xarelto for this, anemia, duodenal AVM, sigmoid diverticulosis, hemorrhoids.  Here with with anemia on outpatient labs    Acute on chronic, symptomatic, microcytic anemia   FOBT+.  No overt GI bleeding.   H/O duodenal AVM  Hgb 7.2 -- 2u prbcs -- 11  Iron panel was checked after transfusion:  Iron low 20,% sat low 5, TIBC normal 384, ferritin on the low end of normal 11.64.      - Keep NPO for EGD today and will also place VCE.   - Give dose of injectafer.  Will need another dose in 7 days as outpatient per PCP.       Thank you for allowing us to participate in this patient's care.

## 2023-01-20 NOTE — ANESTHESIA PREPROCEDURE EVALUATION
01/20/2023  Crystal Lara is a 67 y.o., female admitted January 19th with weakness and further workup revealed significant heme-positive anemia requiring transfusion.  Patient presents today for EGD with video capsule endoscopy.  EKG normal sinus rhythm    2D echo December 2021   EF 45%   Trace AI/MR  RVSP 33      Pre-op Assessment    I have reviewed the Patient Summary Reports.    I have reviewed the NPO Status.   I have reviewed the Medications.     Review of Systems  Anesthesia Hx:   Denies Personal Hx of Anesthesia complications.   Social:  Non-Smoker    Cardiovascular:   Hypertension  Functional Capacity 3 METS        Physical Exam  General: Well nourished, Cooperative, Alert and Oriented    Airway:  Mallampati: II   Mouth Opening: Normal  TM Distance: Normal  Tongue: Normal  Neck ROM: Normal ROM    Dental:  Edentulous    Chest/Lungs:  Clear to auscultation, Normal Respiratory Rate    Heart:  Rate: Normal  Rhythm: Regular Rhythm        Anesthesia Plan  Type of Anesthesia, risks & benefits discussed:    Anesthesia Type: Gen Natural Airway  Intra-op Monitoring Plan: Standard ASA Monitors  Induction:  IV  Informed Consent: Informed consent signed with the Patient and all parties understand the risks and agree with anesthesia plan.  All questions answered. Patient consented to blood products? Yes  ASA Score: 3  Day of Surgery Review of History & Physical: H&P Update referred to the surgeon/provider.    Ready For Surgery From Anesthesia Perspective.     .

## 2023-01-20 NOTE — TRANSFER OF CARE
Anesthesia Transfer of Care Note    Patient: Crystal Lara    Procedure(s) Performed: Procedure(s) (LRB):  EGD/VCE placement (N/A)  CAPSULE ENDOSCOPY, ESOPHAGUS THROUGH ILEUM (N/A)    Patient location: PACU    Anesthesia Type: MAC    Transport from OR: Transported from OR on room air with adequate spontaneous ventilation    Post pain: adequate analgesia    Post assessment: no apparent anesthetic complications and tolerated procedure well    Post vital signs: stable    Level of consciousness: awake and alert    Nausea/Vomiting: no nausea/vomiting    Complications: none    Transfer of care protocol was followed      Last vitals:   Visit Vitals  BP (!) 172/83 (BP Location: Right arm, Patient Position: Sitting)   Pulse 65   Temp 36.3 °C (97.3 °F) (Temporal)   Resp (!) 21   Wt 59 kg (130 lb)   SpO2 (!) 93%   Breastfeeding No   BMI 24.86 kg/m²

## 2023-01-20 NOTE — PROVATION PATIENT INSTRUCTIONS
Discharge Summary/Instructions after an Endoscopic Procedure  Patient Name: Crystal Lara  Patient MRN: [id number]  Patient YOB: 1955 Friday, January 20, 2023  Jose Luis Rodriguez MD  Dear patient,  As a result of recent federal legislation (The Federal Cures Act), you may   receive lab or pathology results from your procedure in your [facility]   account before your physician is able to contact you. Your physician or   their representative will relay the results to you with their   recommendations at their soonest availability.  Thank you,  RESTRICTIONS:  During your procedure today, you received medications for sedation.  These   medications may affect your judgment, balance and coordination.  Therefore,   for 24 hours, you have the following restrictions:   - DO NOT drive a car, operate machinery, make legal/financial decisions,   sign important papers or drink alcohol.    ACTIVITY:  Today: no heavy lifting, straining or running due to procedural   sedation/anesthesia.  The following day: return to full activity including work.  DIET:  Eat and drink normally unless instructed otherwise.     TREATMENT FOR COMMON SIDE EFFECTS:  - Mild abdominal pain, nausea, belching, bloating or excessive gas:  rest,   eat lightly and use a heating pad.  - Sore Throat: treat with throat lozenges and/or gargle with warm salt   water.  - Because air was used during the procedure, expelling large amounts of air   from your rectum or belching is normal.  - If a bowel prep was taken, you may not have a bowel movement for 1-3 days.    This is normal.  SYMPTOMS TO WATCH FOR AND REPORT TO YOUR PHYSICIAN:  1. Abdominal pain or bloating, other than gas cramps.  2. Chest pain.  3. Back pain.  4. Signs of infection such as: chills or fever occurring within 24 hours   after the procedure.  5. Rectal bleeding, which would show as bright red, maroon, or black stools.   (A tablespoon of blood from the rectum is not serious, especially if    hemorrhoids are present.)  6. Vomiting.  7. Weakness or dizziness.  GO DIRECTLY TO THE NEAREST EMERGENCY ROOM IF YOU HAVE ANY OF THE FOLLOWING:      Difficulty breathing              Chills and/or fever over 101 F   Persistent vomiting and/or vomiting blood   Severe abdominal pain   Severe chest pain   Black, tarry stools   Bleeding- more than one tablespoon   Any other symptom or condition that you feel may need urgent attention  Your doctor recommends these additional instructions:  If any biopsies were taken, your doctors clinic will contact you in 1 to 2   weeks with any results.  - Return patient to [facility] for [facility] care.   - Observe patient's clinical course.   - The findings and recommendations were discussed with the patient.  For questions, problems or results please call your physician - Jose Luis Rodriguez MD at Work:  [phone].  [facility], EMERGENCY ROOM PHONE NUMBER: [phone]  IF A COMPLICATION OR EMERGENCY SITUATION ARISES AND YOU ARE UNABLE TO REACH   YOUR PHYSICIAN - GO DIRECTLY TO THE EMERGENCY ROOM.  MD Jose Luis Downey MD  1/20/2023 10:52:39 AM  This report has been verified and signed electronically.  Dear patient,  As a result of recent federal legislation (The Federal Cures Act), you may   receive lab or pathology results from your procedure in your [facility]   account before your physician is able to contact you. Your physician or   their representative will relay the results to you with their   recommendations at their soonest availability.  Thank you,  PROVATION

## 2023-01-20 NOTE — ANESTHESIA POSTPROCEDURE EVALUATION
Anesthesia Post Evaluation    Patient: Crystal Lara    Procedure(s) Performed: Procedure(s) (LRB):  EGD/VCE placement (N/A)  CAPSULE ENDOSCOPY, ESOPHAGUS THROUGH ILEUM (N/A)    Final Anesthesia Type: general      Patient location during evaluation: GI PACU  Patient participation: Yes- Able to Participate  Level of consciousness: awake and alert  Post-procedure vital signs: reviewed and stable  Pain management: adequate  Airway patency: patent    PONV status at discharge: No PONV  Anesthetic complications: no      Cardiovascular status: blood pressure returned to baseline  Respiratory status: spontaneous ventilation and room air  Hydration status: euvolemic  Follow-up not needed.          Vitals Value Taken Time   /65 01/20/23 1125   Temp 36.3 °C (97.3 °F) 01/20/23 1056   Pulse 55 01/20/23 1125   Resp 19 01/20/23 1125   SpO2 98 % 01/20/23 1125         No case tracking events are documented in the log.      Pain/Mimi Score: No data recorded

## 2023-01-20 NOTE — PROGRESS NOTES
[facility] Hospital Medicine Progress Note        CHIEF COMPLAINT   Weakness and abnormal labs     HISTORY OF PRESENT ILLNESS:   Patient is a 67-year-old female with a history of CAD, peripheral vascular disease on low-dose Xarelto, hypertension, osteoarthritis, iron-deficiency anemia and prior GI bleeding in 2021 from a duodenal angiectasia who was referred by her PCP for low H&H on outpatient laboratory work.  Additional complaints were weakness however she denied noting any blood in her stool or melena.  She was also briefly admitted in December for blood transfusion as well the no further workup was done at that time she did have an EGD in November 2021 which noted duodenal angiectasia that was treated with APC.  On arrival to the ER tonight she was afebrile hemodynamically stable but laboratory work showed a hemoglobin of 7.2, stool was noted to be yellow but guaiac positive, she was therefore admitted to the hospitalist service for further management.     Patient currently being managed for anemia with fobt +ve      Todays information  GI has taken for egd and VCE   Give IV Injectafer x 1   Add ppi bid , trend hgb ,   Bp control      PHYSICAL EXAM:     GENERAL: awake, alert, oriented and in no acute distress, non-toxic appearing   HEENT: normocephalic atraumatic   NECK: supple   LUNGS: Clear bilaterally, no wheezing or rales, no accessory muscle use   CVS: Regular rate and rhythm, normal peripheral perfusion  ABD: Soft, non-tender, non-distended, bowel sounds present  EXTREMITIES: no clubbing or cyanosis  SKIN: Warm, dry.   NEURO: alert and oriented, grossly without focal deficits   PSYCHIATRIC: Cooperative         ASSESSMENT & PLAN:   Symptomatic anemia with heme-positive stools   History of duodenal angiectasia/APC 11/2022   Iron-deficiency anemia (noncompliant with oral iron)   History of PID on low-dose Xarelto, HTN, HLD, CAD     Plan  GI taking for egd and VCE   Give IV  Injectafer x 1   Add ppi bid , trend hgb ,   Bp control  resume home medications as appropriate     DVT prophylaxis: SCDs  Code status: full     VITAL SIGNS: 24 HRS MIN & MAX LAST   Temp  Min: 97.3 °F (36.3 °C)  Max: 98.1 °F (36.7 °C) 97.3 °F (36.3 °C)   BP  Min: 102/67  Max: 195/86 (!) 190/84     Pulse  Min: 50  Max: 97  (!) 50     Resp  Min: 15  Max: 23 15   SpO2  Min: 93 %  Max: 100 % (!) 94 %         Recent Labs   Lab 01/19/23  1646 01/20/23  0414   WBC 7.5 8.4   RBC 3.35* 4.71   HGB 7.2* 11.1*   HCT 23.6* 35.4*   MCV 70.4* 75.2*   MCH 21.5 23.6   MCHC 30.5* 31.4*   RDW 20.0* 20.9*    316   MPV 9.5 8.9       Recent Labs   Lab 01/19/23  1645 01/20/23  0414   * 130*   K 3.9 4.0   CO2 24 24   BUN 15.8 11.7   CREATININE 0.94 0.75   CALCIUM 8.9 8.9   ALBUMIN 3.3* 3.4   ALKPHOS 72 71   ALT 12 13   AST 20 23   BILITOT 0.6 1.0          Microbiology Results (last 7 days)       ** No results found for the last 168 hours. **             See below for Radiology    Scheduled Med:   amlodipine-benazepril 5-10 mg  1 capsule Oral Daily    atorvastatin  40 mg Oral Nightly    ezetimibe  10 mg Oral Daily    ferric gluconate (FERRLECIT) IVPB  125 mg Intravenous Daily    metoprolol tartrate  12.5 mg Oral BID    pantoprazole  40 mg Intravenous BID    PHENYLephrine            Continuous Infusions:       PRN Meds:  sodium chloride, sodium chloride, cyclobenzaprine, hydrALAZINE, hydrOXYzine HCL, melatonin, sodium chloride 0.9%       VTE prophylaxis:     Patient condition:  Stable/Fair/Guarded/ Serious/ Critical    Anticipated discharge and Disposition:         All diagnosis and differential diagnosis have been reviewed; assessment and plan has been documented; I have personally reviewed the labs and test results that are presently available; I have reviewed the patients medication list; I have reviewed the consulting providers response and recommendations. I have reviewed or attempted to review medical records based upon  their availability    All of the patient's questions have been  addressed and answered. Patient's is agreeable to the above stated plan. I will continue to monitor closely and make adjustments to medical management as needed.  _____________________________________________________________________    Nutrition Status:    Radiology:  DXA Bone Density Spine And Hip  Narrative: EXAMINATION:  DEXA BONE DENSITY SPINE HIP    CLINICAL HISTORY:  Unspecified menopausal and perimenopausal disorder    COMPARISON:  No reference studies are available for comparison.    FINDINGS:  BMD     T-score    0.910 -2.3.  Lumbar Spine (L1-L4)    0.623 -3.1.  Total Left Femur    0.674 -2.7.  Total Right Femur    0.648 -2.9.  Mean Femur  Impression: Osteoporosis based on the hips.  Significantly increased fracture risk.    Electronically signed by: Domenico Waters  Date:    08/24/2022  Time:    13:09      Aristeo Huerta MD   01/20/2023

## 2023-01-20 NOTE — ED PROVIDER NOTES
Encounter Date: 1/19/2023    SCRIBE #1 NOTE: I, Mila Alvarenga, am scribing for, and in the presence of,  Kj Wooten MD. I have scribed the following portions of the note - Other sections scribed: HPI, ROS, PE.     History     Chief Complaint   Patient presents with    abnormal labs     Pt states that she was told to come to ED due to low H&H. She states that has has fatigue and was told to come to ED by PCP on tues. She states that had transfusion in December. She denies blood in stools or other bleeding.     A 67 year old female with a history of CAD, HTN, HLD, anemia, and peripheral vascular disease is presenting to the ED for abnormal labs. The pt states that she was told by her PCP on Tuesday to present to the ED for further evaluation due to low H&H. She is reporting fatigue and shortness of breath. She denies any blood in her stools. The pt states that she had a transfusion in December due to internal bleeding. The pt is currently takes aspirin daily and is on xarelto.     Her GI doctor is Boo Adams.    The history is provided by the patient. No  was used.   Review of patient's allergies indicates:   Allergen Reactions    Naproxen Swelling     Past Medical History:   Diagnosis Date    Coronary artery disease     Hyperlipidemia     Hypertension      Past Surgical History:   Procedure Laterality Date    CARDIAC CATHETERIZATION      CORONARY ANGIOPLASTY       Family History   Problem Relation Age of Onset    Heart disease Mother     Heart attack Mother     Heart failure Father      Social History     Tobacco Use    Smoking status: Every Day     Packs/day: 0.50     Types: Cigarettes    Smokeless tobacco: Never   Substance Use Topics    Alcohol use: Not Currently    Drug use: Never     Review of Systems   Constitutional:  Negative for chills and fever.   HENT:  Negative for sinus pain.    Respiratory:  Positive for shortness of breath. Negative for cough and chest tightness.     Cardiovascular:  Negative for chest pain.   Gastrointestinal:  Negative for abdominal pain, diarrhea, nausea and vomiting.   Genitourinary:  Negative for dysuria and hematuria.   Skin:  Negative for rash.   Neurological:  Positive for weakness. Negative for syncope.   All other systems reviewed and are negative.    Physical Exam     Initial Vitals [01/19/23 1609]   BP Pulse Resp Temp SpO2   102/67 97 20 98.1 °F (36.7 °C) 97 %      MAP       --         Physical Exam    Nursing note and vitals reviewed.  Constitutional: She appears well-developed and well-nourished. No distress.   HENT:   Head: Normocephalic and atraumatic.   Eyes: Conjunctivae are normal.   Cardiovascular:  Normal rate and intact distal pulses.           Pulmonary/Chest: No respiratory distress. She has no rhonchi.   Abdominal: Abdomen is soft. Bowel sounds are normal. There is no abdominal tenderness. There is no rebound and no guarding.   Genitourinary:    Genitourinary Comments: Yellowish/brown stool  Hemoccult positive      Musculoskeletal:         General: No edema.     Neurological: She is alert. She has normal strength.   Skin: Skin is warm and dry.   Psychiatric: She has a normal mood and affect.       ED Course   Critical Care    Date/Time: 1/19/2023 10:44 PM  Performed by: Kj Wooten MD  Authorized by: Kj Wooten MD   Direct patient critical care time: 15 minutes  Additional history critical care time: 5 minutes  Ordering / reviewing critical care time: 6 minutes  Documentation critical care time: 7 minutes  Consulting other physicians critical care time: 3 minutes  Total critical care time (exclusive of procedural time) : 36 minutes      Labs Reviewed   COMPREHENSIVE METABOLIC PANEL - Abnormal; Notable for the following components:       Result Value    Sodium Level 130 (*)     Glucose Level 135 (*)     Albumin Level 3.3 (*)     Globulin 4.3 (*)     Albumin/Globulin Ratio 0.8 (*)     All other components within normal  limits   CBC WITH DIFFERENTIAL - Abnormal; Notable for the following components:    RBC 3.35 (*)     Hgb 7.2 (*)     Hct 23.6 (*)     MCV 70.4 (*)     MCHC 30.5 (*)     RDW 20.0 (*)     All other components within normal limits   BLOOD SMEAR MICROSCOPIC EXAM (OLG) - Abnormal; Notable for the following components:    RBC Morph Abnormal (*)     Anisocyte 1+ (*)     Hypochrom 2+ (*)     Microcyte 1+ (*)     Poik 1+ (*)     Target Cell 1+ (*)     All other components within normal limits   TROPONIN I - Normal   URINALYSIS, REFLEX TO URINE CULTURE - Normal   URINALYSIS, MICROSCOPIC - Normal   CBC W/ AUTO DIFFERENTIAL    Narrative:     The following orders were created for panel order CBC auto differential.  Procedure                               Abnormality         Status                     ---------                               -----------         ------                     CBC with Differential[[id number]]        Abnormal            Final result                 Please view results for these tests on the individual orders.   TYPE & SCREEN   PREPARE RBC SOFT   PREPARE RBC SOFT          Imaging Results    None          Medications   sodium chloride 0.9% bolus 1,000 mL 1,000 mL (has no administration in time range)   0.9%  NaCl infusion (for blood administration) (has no administration in time range)   0.9%  NaCl infusion (for blood administration) (has no administration in time range)     Medical Decision Making:   History:   Old Medical Records: I decided to obtain old medical records.  Clinical Tests:   Lab Tests: Ordered and Reviewed   Medical Decision Making   MDM  Problems addressed  Co-morbidities and/or factors adding to the complexity or risk for the patient: anemia, gi bleeding, cad, pvd  Problems addressed: symptomatic anemia  Acute problem/illness or progression/exacerbation of chronic problem with potential threat to life/bodily dysfunction?: yes  Differential diagnoses/problems considered: see above      Amount and/or Complexity of Data Reviewed  Independent Historian: daughter  (see above for summary)  External Data Reviewed: notes from previous admissions, notes from previous ED visits, notes from clinic visits, prior labs, and previous procedure and OR notes (see above for summary)  Risk and benefits of testing: discussed   Labs: Labs: ordered and reviewed      discussed with hospitalist physician    Risk  Prescription management   Decision regarding hospitalization    Critical Care  30-74 minutes          Scribe Attestation:   Scribe #1: I performed the above scribed service and the documentation accurately describes the services I performed. I attest to the accuracy of the note.    Attending Attestation:           Physician Attestation for Scribe:  Physician Attestation Statement for Scribe #1: I, Kj Wooten MD, reviewed documentation, as scribed by Mila Alvarenga in my presence, and it is both accurate and complete.           ED Course as of 01/19/23 2244   Thu Jan 19, 2023 2034 Hx of CAD, H&H Goal of 8.  [RP]      ED Course User Index  [RP] Keaton Carnes MD                 Clinical Impression:   Final diagnoses:  [R53.1] Weakness  [K92.2] Gastrointestinal hemorrhage, unspecified gastrointestinal hemorrhage type (Primary)  [D64.9] Anemia, unspecified type  [R53.1] General weakness        ED Disposition Condition    Admit Stable                Kj Wooten MD  01/19/23 2244

## 2023-01-20 NOTE — H&P
[facility] Hospital Medicine History & Physical Examination       Patient Name: Crystal Lara  MRN: [id number]  Patient Class: IP- Inpatient   Admission Date: 1/19/2023  4:14 PM  Length of Stay: 1  Admitting Service: Hospital Medicine   Attending Physician: Michael Chen MD   Primary Care Provider: CARLA Gandhi  History source: EMR, patient and/or patient's family    CHIEF COMPLAINT   Weakness and abnormal labs    HISTORY OF PRESENT ILLNESS:   Patient is a 67-year-old female with a history of CAD, peripheral vascular disease on low-dose Xarelto, hypertension, osteoarthritis, iron-deficiency anemia and prior GI bleeding in 2021 from a duodenal angiectasia who was referred by her PCP for low H&H on outpatient laboratory work.  Additional complaints were weakness however she denied noting any blood in her stool or melena.  She was also briefly admitted in December for blood transfusion as well the no further workup was done at that time she did have an EGD in November 2021 which noted duodenal angiectasia that was treated with APC.  On arrival to the ER tonight she was afebrile hemodynamically stable but laboratory work showed a hemoglobin of 7.2, stool was noted to be yellow but guaiac positive, she was therefore admitted to the hospitalist service for further management.    PAST MEDICAL HISTORY:   Coronary artery disease, nonobstructive   Peripheral arterial disease on low-dose Xarelto  Hypertension  Hyperlipidemia  Chronic tobacco use   Duodenal angiectasias with recurrent GI bleeding    PAST SURGICAL HISTORY:     Past Surgical History:   Procedure Laterality Date    CARDIAC CATHETERIZATION      CORONARY ANGIOPLASTY         ALLERGIES:   Naproxen    FAMILY HISTORY:   Reviewed and non-contributory     SOCIAL HISTORY:     Social History     Tobacco Use    Smoking status: Every Day     Packs/day: 0.50     Types: Cigarettes    Smokeless tobacco: Never   Substance Use Topics     Alcohol use: Not Currently        HOME MEDICATIONS:     Prior to Admission medications    Medication Sig Start Date End Date Taking? Authorizing Provider   amlodipine-benazepril 5-10 mg (LOTREL) 5-10 mg per capsule Take 1 capsule by mouth Daily. 11/28/22  Yes Dedra Brumfield MD   aspirin (ECOTRIN) 81 MG EC tablet Take 1 tablet by mouth Daily.   Yes Historical Provider   atorvastatin (LIPITOR) 40 MG tablet Take 1 tablet (40 mg total) by mouth nightly. 11/28/22  Yes Dedra Brumfield MD   cyclobenzaprine (FLEXERIL) 10 MG tablet Take 10 mg by mouth as needed. 11/11/21  Yes Historical Provider   esomeprazole magnesium 20 mg TbEC Take 1 capsule by mouth nightly.   Yes Historical Provider   ezetimibe (ZETIA) 10 mg tablet Take 1 tablet (10 mg total) by mouth Daily. 11/28/22  Yes Dedra Brumfield MD   metoprolol tartrate (LOPRESSOR) 25 MG tablet Take 0.5 tablets (12.5 mg total) by mouth 2 (two) times a day. 11/28/22  Yes Dedra Brumfield MD   rivaroxaban (XARELTO) 2.5 mg Tab Take 1 tablet (2.5 mg total) by mouth 2 (two) times a day. 11/28/22  Yes Dedra Brumfield MD   docusate sodium 100 mg capsule Take 1 tablet by mouth every other day.    Historical Provider   ferrous sulfate 325 (65 FE) MG EC tablet Take 1 tablet (325 mg total) by mouth 2 (two) times daily.  Patient not taking: Reported on 1/5/2023 12/24/22   Alberto Mario MD   nicotine (NICODERM CQ) 14 mg/24 hr Place 1 patch onto the skin once daily.  Patient not taking: Reported on 1/5/2023 7/7/22   Dedra Brumfield MD   nicotine polacrilex 2 MG Lozg Take 1 lozenge (2 mg total) by mouth as needed (smoking urge).  Patient not taking: Reported on 1/5/2023 7/7/22   Dedra Brumfield MD       REVIEW OF SYSTEMS:   Except as documented, all other systems reviewed and negative     PHYSICAL EXAM:   T 97.6 °F (36.4 °C)   BP (!) 173/87   P 70   RR 17   O2 97 %  GENERAL: awake, alert, oriented and in no acute distress, non-toxic appearing   HEENT: normocephalic atraumatic    NECK: supple   LUNGS: Clear bilaterally, no wheezing or rales, no accessory muscle use   CVS: Regular rate and rhythm, normal peripheral perfusion  ABD: Soft, non-tender, non-distended, bowel sounds present  EXTREMITIES: no clubbing or cyanosis  SKIN: Warm, dry.   NEURO: alert and oriented, grossly without focal deficits   PSYCHIATRIC: Cooperative    LABS AND IMAGING:     Recent Labs     01/19/23  1646   WBC 7.5   RBC 3.35*   HGB 7.2*   HCT 23.6*   MCV 70.4*   MCH 21.5   MCHC 30.5*   RDW 20.0*        No results for input(s): LACTIC in the last 72 hours.  No results for input(s): INR, APTT, D-DIMER in the last 72 hours.  No results for input(s): HGBA1C, CHOL, TRIG, LDL, VLDL, HDL in the last 72 hours.   Recent Labs     01/19/23  1645   *   K 3.9   CHLORIDE 98   CO2 24   BUN 15.8   CREATININE 0.94   GLUCOSE 135*   CALCIUM 8.9   ALBUMIN 3.3*   GLOBULIN 4.3*   ALKPHOS 72   ALT 12   AST 20   BILITOT 0.6     Recent Labs     01/19/23  1645   TROPONINI <0.010            ASSESSMENT & PLAN:   Symptomatic anemia with heme-positive stools   History of duodenal angiectasia/APC 11/2022   Iron-deficiency anemia (noncompliant with oral iron)   History of PID on low-dose Xarelto, HTN, HLD, CAD    - IV Protonix, NPO, consultation to GI   - transfuse 2 units of blood  - IV iron while inpatient and switch to oral at discharge  - resume home medications as appropriate    DVT prophylaxis: SCDs  Code status: full     If patient was admitted under observational status it is with my approval/permission.     At least 55 min was spent on this history and physical.  Time seen: 11pm 1/19/23  Critical care time = 35 min; Critical care diagnosis = anemia/transfusion   Michael Chen MD

## 2023-01-21 LAB
HCT VFR BLD AUTO: 36.9 % (ref 37–47)
HGB BLD-MCNC: 11.7 GM/DL (ref 12–16)

## 2023-01-21 PROCEDURE — 94761 N-INVAS EAR/PLS OXIMETRY MLT: CPT

## 2023-01-21 PROCEDURE — 36415 COLL VENOUS BLD VENIPUNCTURE: CPT | Performed by: INTERNAL MEDICINE

## 2023-01-21 PROCEDURE — 85014 HEMATOCRIT: CPT | Performed by: INTERNAL MEDICINE

## 2023-01-21 PROCEDURE — 63600175 PHARM REV CODE 636 W HCPCS: Performed by: INTERNAL MEDICINE

## 2023-01-21 PROCEDURE — 25000003 PHARM REV CODE 250: Performed by: INTERNAL MEDICINE

## 2023-01-21 PROCEDURE — C9113 INJ PANTOPRAZOLE SODIUM, VIA: HCPCS | Performed by: INTERNAL MEDICINE

## 2023-01-21 PROCEDURE — 11000001 HC ACUTE MED/SURG PRIVATE ROOM

## 2023-01-21 RX ORDER — AMLODIPINE AND BENAZEPRIL HYDROCHLORIDE 5; 10 MG/1; MG/1
1 CAPSULE ORAL DAILY
Status: DISCONTINUED | OUTPATIENT
Start: 2023-01-21 | End: 2023-01-22 | Stop reason: HOSPADM

## 2023-01-21 RX ADMIN — EZETIMIBE 10 MG: 10 TABLET ORAL at 05:01

## 2023-01-21 RX ADMIN — METOPROLOL TARTRATE 12.5 MG: 25 TABLET, FILM COATED ORAL at 09:01

## 2023-01-21 RX ADMIN — METOPROLOL TARTRATE 12.5 MG: 25 TABLET, FILM COATED ORAL at 08:01

## 2023-01-21 RX ADMIN — PANTOPRAZOLE SODIUM 40 MG: 40 INJECTION, POWDER, FOR SOLUTION INTRAVENOUS at 08:01

## 2023-01-21 RX ADMIN — SODIUM CHLORIDE 125 MG: 9 INJECTION, SOLUTION INTRAVENOUS at 09:01

## 2023-01-21 RX ADMIN — ATORVASTATIN CALCIUM 40 MG: 40 TABLET, FILM COATED ORAL at 08:01

## 2023-01-21 RX ADMIN — PANTOPRAZOLE SODIUM 40 MG: 40 INJECTION, POWDER, FOR SOLUTION INTRAVENOUS at 09:01

## 2023-01-21 NOTE — PROGRESS NOTES
[facility]  Hospital Medicine Progress Note      CHIEF COMPLAINT   Weakness and abnormal labs     HISTORY OF PRESENT ILLNESS:   Patient is a 67-year-old female with a history of CAD, peripheral vascular disease on low-dose Xarelto, hypertension, osteoarthritis, iron-deficiency anemia and prior GI bleeding in 2021 from a duodenal angiectasia who was referred by her PCP for low H&H on outpatient laboratory work.  Additional complaints were weakness however she denied noting any blood in her stool or melena.  She was also briefly admitted in December for blood transfusion as well the no further workup was done at that time she did have an EGD in November 2021 which noted duodenal angiectasia that was treated with APC.  On arrival to the ER tonight she was afebrile hemodynamically stable but laboratory work showed a hemoglobin of 7.2, stool was noted to be yellow but guaiac positive, she was therefore admitted to the hospitalist service for further management.     Patient currently being managed for anemia with fobt +ve, gi bleed.  Status post upper GI endoscopy on 01/20 with placement of a vce        Todays information  patient seen and examined at bedside   Patient currently has no complaints.  She is yet to have a bowel movement today.    Hemoglobin levels stable at 11.7 grams/dL  She is status post IV Injectaferx 1  Will trend hemoglobin levels tomorrow, if stable plan to discharge        PHYSICAL EXAM:      GENERAL: awake, alert, oriented and in no acute distress, non-toxic appearing   HEENT: normocephalic atraumatic   NECK: supple   LUNGS: Clear bilaterally, no wheezing or rales, no accessory muscle use   CVS: Regular rate and rhythm, normal peripheral perfusion  ABD: Soft, non-tender, non-distended, bowel sounds present  EXTREMITIES: no clubbing or cyanosis  SKIN: Warm, dry.   NEURO: alert and oriented, grossly without focal deficits   PSYCHIATRIC: Cooperative           ASSESSMENT &  PLAN:   GI Bleed   Symptomatic anemia with heme-positive stools   History of duodenal angiectasia/APC 11/2022   Iron-deficiency anemia (noncompliant with oral iron)   History of PID on low-dose Xarelto, HTN, HLD, CAD     Plan  F/up gi recs, egd results  Hemoglobin levels stable at 11.7 grams/dL  She is status post IV Injectaferx 1  Will trend hemoglobin levels tomorrow, if stable plan to discharge   Add ppi bid ,   Bp control  resume home medications as appropriate     DVT prophylaxis: SCDs  Code status: full     Disposition likely discharged next 24 hours if hemoglobin levels stable and no further GI bleed.  VITAL SIGNS: 24 HRS MIN & MAX LAST   Temp  Min: 97.8 °F (36.6 °C)  Max: 98.8 °F (37.1 °C) 98.8 °F (37.1 °C)   BP  Min: 140/76  Max: 190/84 (!) 142/70     Pulse  Min: 50  Max: 78  (!) 57     Resp  Min: 15  Max: 21 18   SpO2  Min: 93 %  Max: 98 % 97 %       Recent Labs   Lab 01/19/23  1646 01/20/23  0414 01/21/23  0326   WBC 7.5 8.4  --    RBC 3.35* 4.71  --    HGB 7.2* 11.1* 11.7*   HCT 23.6* 35.4* 36.9*   MCV 70.4* 75.2*  --    MCH 21.5 23.6  --    MCHC 30.5* 31.4*  --    RDW 20.0* 20.9*  --     316  --    MPV 9.5 8.9  --        Recent Labs   Lab 01/19/23  1645 01/20/23  0414   * 130*   K 3.9 4.0   CO2 24 24   BUN 15.8 11.7   CREATININE 0.94 0.75   CALCIUM 8.9 8.9   ALBUMIN 3.3* 3.4   ALKPHOS 72 71   ALT 12 13   AST 20 23   BILITOT 0.6 1.0          Microbiology Results (last 7 days)       ** No results found for the last 168 hours. **             See below for Radiology    Scheduled Med:   amlodipine-benazepril 5-10 mg  1 capsule Oral Daily    atorvastatin  40 mg Oral Nightly    ezetimibe  10 mg Oral Daily    ferric gluconate (FERRLECIT) IVPB  125 mg Intravenous Daily    metoprolol tartrate  12.5 mg Oral BID    pantoprazole  40 mg Intravenous BID        Continuous Infusions:       PRN Meds:  sodium chloride, sodium chloride, cyclobenzaprine, hydrALAZINE, hydrOXYzine HCL, melatonin, sodium chloride  0.9%           VTE prophylaxis:     Patient condition:  Stable/Fair/Guarded/ Serious/ Critical    Anticipated discharge and Disposition:         All diagnosis and differential diagnosis have been reviewed; assessment and plan has been documented; I have personally reviewed the labs and test results that are presently available; I have reviewed the patients medication list; I have reviewed the consulting providers response and recommendations. I have reviewed or attempted to review medical records based upon their availability    All of the patient's questions have been  addressed and answered. Patient's is agreeable to the above stated plan. I will continue to monitor closely and make adjustments to medical management as needed.  _____________________________________________________________________    Nutrition Status:    Radiology:  DXA Bone Density Spine And Hip  Narrative: EXAMINATION:  DEXA BONE DENSITY SPINE HIP    CLINICAL HISTORY:  Unspecified menopausal and perimenopausal disorder    COMPARISON:  No reference studies are available for comparison.    FINDINGS:  BMD     T-score    0.910 -2.3.  Lumbar Spine (L1-L4)    0.623 -3.1.  Total Left Femur    0.674 -2.7.  Total Right Femur    0.648 -2.9.  Mean Femur  Impression: Osteoporosis based on the hips.  Significantly increased fracture risk.    Electronically signed by: Domenico Waters  Date:    08/24/2022  Time:    13:09      Aristeo Huerta MD   01/21/2023

## 2023-01-21 NOTE — PROGRESS NOTES
VS, labs and notes reviewed. VCE results won't be available until Monday. Ok to discharge in the interim if she remains stable.

## 2023-01-21 NOTE — NURSING
Nurses Note -- 4 Eyes      1/21/2023   4:44 AM      Skin assessed during: Admit      [x] No Pressure Injuries Present    []Prevention Measures Documented      [] Yes- Altered Skin Integrity Present or Discovered   [] LDA Added if Not in Epic (Describe Wound)   [] New Altered Skin Integrity was Present on Admit and Documented in LDA   [] Wound Image Taken    Wound Care Consulted? No    Attending Nurse:  Surendra Dailey RN     Second RN/Staff Member:  Vee Lam

## 2023-01-22 VITALS
OXYGEN SATURATION: 96 % | BODY MASS INDEX: 24.55 KG/M2 | RESPIRATION RATE: 18 BRPM | DIASTOLIC BLOOD PRESSURE: 80 MMHG | WEIGHT: 130.06 LBS | HEIGHT: 61 IN | TEMPERATURE: 98 F | SYSTOLIC BLOOD PRESSURE: 159 MMHG | HEART RATE: 65 BPM

## 2023-01-22 LAB
HCT VFR BLD AUTO: 37.2 % (ref 37–47)
HGB BLD-MCNC: 11.9 GM/DL (ref 12–16)

## 2023-01-22 PROCEDURE — 85014 HEMATOCRIT: CPT | Performed by: INTERNAL MEDICINE

## 2023-01-22 PROCEDURE — 25000003 PHARM REV CODE 250: Performed by: INTERNAL MEDICINE

## 2023-01-22 PROCEDURE — 36415 COLL VENOUS BLD VENIPUNCTURE: CPT | Performed by: INTERNAL MEDICINE

## 2023-01-22 RX ADMIN — METOPROLOL TARTRATE 12.5 MG: 25 TABLET, FILM COATED ORAL at 08:01

## 2023-01-22 NOTE — PLAN OF CARE
Problem: Adult Inpatient Plan of Care  Goal: Plan of Care Review  Outcome: Ongoing, Progressing  Flowsheets (Taken 1/21/2023 2000)  Plan of Care Reviewed With:   patient   daughter  Goal: Patient-Specific Goal (Individualized)  Outcome: Ongoing, Progressing  Goal: Absence of Hospital-Acquired Illness or Injury  Outcome: Ongoing, Progressing  Intervention: Identify and Manage Fall Risk  Flowsheets (Taken 1/21/2023 2000)  Safety Promotion/Fall Prevention:   assistive device/personal item within reach   Fall Risk reviewed with patient/family   nonskid shoes/socks when out of bed   side rails raised x 2  Intervention: Prevent Skin Injury  Flowsheets (Taken 1/21/2023 2000)  Body Position: supine  Skin Protection: protective footwear used  Intervention: Prevent and Manage VTE (Venous Thromboembolism) Risk  Flowsheets (Taken 1/21/2023 2000)  Activity Management: Ambulated to bathroom - L4  VTE Prevention/Management:   remove, assess skin, and reapply sequential compression device   ambulation promoted  Range of Motion: active ROM (range of motion) encouraged  Intervention: Prevent Infection  Flowsheets (Taken 1/21/2023 2000)  Infection Prevention:   environmental surveillance performed   hand hygiene promoted   personal protective equipment utilized   rest/sleep promoted   single patient room provided   equipment surfaces disinfected  Goal: Optimal Comfort and Wellbeing  Outcome: Ongoing, Progressing  Intervention: Monitor Pain and Promote Comfort  Flowsheets (Taken 1/21/2023 2000)  Pain Management Interventions:   care clustered   quiet environment facilitated  Intervention: Provide Person-Centered Care  Flowsheets (Taken 1/21/2023 2000)  Trust Relationship/Rapport:   choices provided   emotional support provided   empathic listening provided   questions answered   care explained   thoughts/feelings acknowledged  Goal: Readiness for Transition of Care  Outcome: Ongoing, Progressing

## 2023-01-22 NOTE — DISCHARGE SUMMARY
[facility] Hospital Medicine Discharge Summary    Admit Date: 1/19/2023  Discharge Date and Time: 1/22/20233:16 PM  Admitting Physician:  Team  Discharging Physician: Aristeo Huerta MD.  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  Additional complaints were weakness however she denied noting any blood in her stool or melena.  She was also briefly admitted in December for blood transfusion as well the no further workup was done at that time she did have an EGD in November 2021 which noted duodenal angiectasia that was treated with APC.  On arrival to the ER tonight she was afebrile hemodynamically stable but laboratory work showed a hemoglobin of 7.2, stool was noted to be yellow but guaiac positive, she was therefore admitted to the hospitalist service for further management.     Patient currently being managed for anemia with fobt +ve, gi bleed.  Status post upper GI endoscopy on 01/20 with placement of a vce. Upper gi egd was negative , capsule was passed- results will be out on Monday, GI will f/up with her and share the result with her. GI said okay to resume xarelto and aspirin      Hemoglobin levels stable at 11.7 grams/dL        PHYSICAL EXAM:      GENERAL: awake, alert, oriented and in no acute distress, non-toxic  appearing   HEENT: normocephalic atraumatic   NECK: supple   LUNGS: Clear bilaterally, no wheezing or rales, no accessory muscle use   CVS: Regular rate and rhythm, normal peripheral perfusion  ABD: Soft, non-tender, non-distended, bowel sounds present  EXTREMITIES: no clubbing or cyanosis  SKIN: Warm, dry.   NEURO: alert and oriented, grossly without focal deficits   PSYCHIATRIC: Cooperative      Pt was seen and examined on the day of discharge  Vitals:  VITAL SIGNS: 24 HRS MIN & MAX LAST   Temp  Min: 98.1 °F (36.7 °C)  Max: 98.6 °F (37 °C) 98.1 °F (36.7 °C)   BP  Min: 145/74  Max: 160/71 (!) 159/80     Pulse  Min: 57  Max: 67  65   Resp  Min: 16  Max: 18 18   SpO2  Min: 92 %  Max: 96 % 96 %     Procedures Performed: No admission procedures for hospital encounter.     Significant Diagnostic Studies: See Full reports for all details    Recent Labs   Lab 01/19/23  1646 01/20/23  0414 01/21/23  0326 01/22/23  0422   WBC 7.5 8.4  --   --    RBC 3.35* 4.71  --   --    HGB 7.2* 11.1* 11.7* 11.9*   HCT 23.6* 35.4* 36.9* 37.2   MCV 70.4* 75.2*  --   --    MCH 21.5 23.6  --   --    MCHC 30.5* 31.4*  --   --    RDW 20.0* 20.9*  --   --     316  --   --    MPV 9.5 8.9  --   --        Recent Labs   Lab 01/19/23  1645 01/20/23  0414   * 130*   K 3.9 4.0   CO2 24 24   BUN 15.8 11.7   CREATININE 0.94 0.75   CALCIUM 8.9 8.9   ALBUMIN 3.3* 3.4   ALKPHOS 72 71   ALT 12 13   AST 20 23   BILITOT 0.6 1.0        Microbiology Results (last 7 days)       ** No results found for the last 168 hours. **             DXA Bone Density Spine And Hip  Narrative: EXAMINATION:  DEXA BONE DENSITY SPINE HIP    CLINICAL HISTORY:  Unspecified menopausal and perimenopausal disorder    COMPARISON:  No reference studies are available for comparison.    FINDINGS:  BMD     T-score    0.910 -2.3.  Lumbar Spine (L1-L4)    0.623 -3.1.  Total Left Femur    0.674 -2.7.  Total Right Femur    0.648 -2.9.  Mean Femur  Impression: Osteoporosis based on  the hips.  Significantly increased fracture risk.    Electronically signed by: Domenico Waters  Date:    08/24/2022  Time:    13:09         Medication List        CONTINUE taking these medications      amlodipine-benazepril 5-10 mg 5-10 mg per capsule  Commonly known as: LOTREL  Take 1 capsule by mouth Daily.     aspirin 81 MG EC tablet  Commonly known as: ECOTRIN     atorvastatin 40 MG tablet  Commonly known as: LIPITOR  Take 1 tablet (40 mg total) by mouth nightly.     cyclobenzaprine 10 MG tablet  Commonly known as: FLEXERIL     docusate sodium 100 mg capsule     esomeprazole magnesium 20 mg Tbec     ezetimibe 10 mg tablet  Commonly known as: ZETIA  Take 1 tablet (10 mg total) by mouth Daily.     metoprolol tartrate 25 MG tablet  Commonly known as: LOPRESSOR  Take 0.5 tablets (12.5 mg total) by mouth 2 (two) times a day.     XARELTO 2.5 mg Tab  Generic drug: rivaroxaban  Take 1 tablet (2.5 mg total) by mouth 2 (two) times a day.            STOP taking these medications      ferrous sulfate 325 (65 FE) MG EC tablet     nicotine 14 mg/24 hr  Commonly known as: NICODERM CQ     nicotine polacrilex 2 MG Lozg               Explained in detail to the patient about the discharge plan, medications, and follow-up visits. Pt understands and agrees with the treatment plan  Discharge Disposition: Home or Self Care   Discharged Condition: stable  Diet-    Medications Per DC med rec  Activities as tolerated   Follow-up Information       CARLA Gandhi Follow up.    Specialty: Family Medicine  Contact information:  [address] [place] [id number] [phone]               Jose Luis Rodriguez MD. Schedule an appointment as soon as possible for a visit in 2 week(s).    Specialty: Gastroenterology  Why: Office will call you with a follow-up appointment.  Contact information:  [address] [address] [phone]                           For further questions  contact hospitalist office    Discharge time 33 minutes    For worsening symptoms, chest pain, shortness of breath, increased abdominal pain, high grade fever, stroke or stroke like symptoms, immediately go to the nearest Emergency Room or call 911 as soon as possible.      Aristeo Koenig M.D on 1/22/2023. at 3:16 PM.

## 2023-01-24 ENCOUNTER — HOSPITAL ENCOUNTER (EMERGENCY)
Facility: HOSPITAL | Age: 68
Discharge: HOME | End: 2023-01-24
Attending: EMERGENCY MEDICINE
Payer: MEDICARE

## 2023-01-24 VITALS
TEMPERATURE: 98 F | SYSTOLIC BLOOD PRESSURE: 147 MMHG | OXYGEN SATURATION: 98 % | WEIGHT: 137 LBS | HEART RATE: 63 BPM | DIASTOLIC BLOOD PRESSURE: 78 MMHG | RESPIRATION RATE: 18 BRPM | HEIGHT: 61 IN | BODY MASS INDEX: 25.86 KG/M2

## 2023-01-24 DIAGNOSIS — I80.8 SUPERFICIAL THROMBOPHLEBITIS OF LEFT UPPER EXTREMITY: Primary | ICD-10-CM

## 2023-01-24 PROCEDURE — 99284 EMERGENCY DEPT VISIT MOD MDM: CPT | Mod: 25

## 2023-01-24 RX ORDER — DICLOFENAC SODIUM 10 MG/G
2 GEL TOPICAL 2 TIMES DAILY
Qty: 50 G | Refills: 1 | Status: SHIPPED | OUTPATIENT
Start: 2023-01-24

## 2023-01-24 RX ORDER — CEPHALEXIN 250 MG/1
250 CAPSULE ORAL 4 TIMES DAILY
Qty: 28 CAPSULE | Refills: 0 | Status: SHIPPED | OUTPATIENT
Start: 2023-01-24 | End: 2023-01-31

## 2023-01-24 NOTE — ED PROVIDER NOTES
Encounter Date: 1/24/2023       History     Chief Complaint   Patient presents with    Arm Pain     Pt presents to ER with concerns of left AC pain, redness, and swelling; pt was admitted last week for blood transfusions, on Saturday AM began having pain and noticed redness; pt states nurses removed IV later that night, however pain/redness/swelling has continued; denies fevers      This 67-year-old female presents with a left antecubital area of redness swelling and tenderness after she was admitted last week and received transfusions and iron infusions.  She states she began having pain and noticed redness when the infusions were occurring however it has gotten worse since.  She denies fever.     Review of patient's allergies indicates:   Allergen Reactions    Naproxen Swelling     Past Medical History:   Diagnosis Date    Coronary artery disease     Hyperlipidemia     Hypertension      Past Surgical History:   Procedure Laterality Date    CAPSULE ENDOSCOPY, ESOPHAGUS THROUGH ILEUM N/A 1/20/2023    Procedure: CAPSULE ENDOSCOPY, ESOPHAGUS THROUGH ILEUM;  Surgeon: Jose Luis Rodriguez MD;  Location: [facility] ENDOSCOPY;  Service: Gastroenterology;  Laterality: N/A;    CARDIAC CATHETERIZATION      CORONARY ANGIOPLASTY      ESOPHAGOGASTRODUODENOSCOPY N/A 1/20/2023    Procedure: EGD/VCE placement;  Surgeon: Jose Luis Rodriguez MD;  Location: [facility] ENDOSCOPY;  Service: Gastroenterology;  Laterality: N/A;     Family History   Problem Relation Age of Onset    Heart disease Mother     Heart attack Mother     Heart failure Father      Social History     Tobacco Use    Smoking status: Every Day     Packs/day: 0.50     Types: Cigarettes    Smokeless tobacco: Never   Substance Use Topics    Alcohol use: Not Currently    Drug use: Never     Review of Systems   Constitutional:  Negative for fever.   HENT:  Negative for sore throat.    Respiratory:  Negative for shortness of breath.    Cardiovascular:  Negative for chest pain.    Gastrointestinal:  Negative for nausea.   Genitourinary:  Negative for dysuria.   Musculoskeletal:  Negative for back pain.   Skin:  Negative for rash.   Neurological:  Negative for weakness.   Hematological:  Does not bruise/bleed easily.     Physical Exam     Initial Vitals [01/24/23 0735]   BP Pulse Resp Temp SpO2   (!) 147/78 63 18 97.9 °F (36.6 °C) 98 %      MAP       --         Physical Exam    Nursing note and vitals reviewed.  Constitutional: She appears well-developed and well-nourished.   HENT:   Head: Normocephalic and atraumatic.   Eyes: Conjunctivae and EOM are normal. Pupils are equal, round, and reactive to light.   Neck: Neck supple.   Normal range of motion.  Cardiovascular:  Normal rate, regular rhythm, normal heart sounds and intact distal pulses.           Pulmonary/Chest: Breath sounds normal.   Abdominal: Abdomen is soft. Bowel sounds are normal.   Musculoskeletal:         General: Normal range of motion.      Cervical back: Normal range of motion and neck supple.     Neurological: She is alert and oriented to person, place, and time. She has normal strength.   Skin: Skin is warm and dry. Capillary refill takes less than 2 seconds. There is erythema (erythema in left anticubital fossa).   Psychiatric: She has a normal mood and affect. Her behavior is normal. Judgment and thought content normal.       ED Course   Procedures  Labs Reviewed - No data to display       Imaging Results              US Upper Extremity Veins Left (Preliminary result)  Result time 01/24/23 09:04:36      ED Interpretation by Rojas Kovacs MD (01/24/23 09:04:36, [facility] Emergency Dept, Emergency Medicine)    Tech Impression is superficial vein clot.                                      Medications - No data to display                           Clinical Impression:   Final diagnoses:  [I80.8] Superficial thrombophlebitis of left upper extremity (Primary)        ED Disposition Condition    Discharge  Stable          ED Prescriptions       Medication Sig Dispense Start Date End Date Auth. Provider    diclofenac sodium (VOLTAREN) 1 % Gel Apply 2 g topically 2 (two) times daily. 50 g 1/24/2023 -- Rojas Kovacs MD    cephALEXin (KEFLEX) 250 MG capsule Take 1 capsule (250 mg total) by mouth 4 (four) times daily. for 7 days 28 capsule 1/24/2023 1/31/2023 Rojas Kovacs MD          Follow-up Information       Follow up With Specialties Details Why Contact Info    [facility]a [facility]   [address] [place] [id number]  [phone]               Rojas Kovacs MD  01/24/23 0956

## 2023-02-14 DIAGNOSIS — D50.9 IRON (FE) DEFICIENCY ANEMIA: Primary | ICD-10-CM

## 2023-02-28 ENCOUNTER — LAB VISIT (OUTPATIENT)
Dept: LAB | Facility: HOSPITAL | Age: 68
End: 2023-02-28
Attending: PEDIATRICS
Payer: MEDICARE

## 2023-02-28 DIAGNOSIS — D50.9 IRON DEFICIENCY ANEMIA, UNSPECIFIED: Primary | ICD-10-CM

## 2023-02-28 LAB
BASOPHILS # BLD AUTO: 0.07 X10(3)/MCL (ref 0–0.2)
BASOPHILS NFR BLD AUTO: 1 %
EOSINOPHIL # BLD AUTO: 0.28 X10(3)/MCL (ref 0–0.9)
EOSINOPHIL NFR BLD AUTO: 4 %
ERYTHROCYTE [DISTWIDTH] IN BLOOD BY AUTOMATED COUNT: 25.1 % (ref 11.5–17)
HCT VFR BLD AUTO: 41.6 % (ref 37–47)
HGB BLD-MCNC: 13.3 G/DL (ref 12–16)
IMM GRANULOCYTES # BLD AUTO: 0 X10(3)/MCL (ref 0–0.04)
IMM GRANULOCYTES NFR BLD AUTO: 0 %
LYMPHOCYTES # BLD AUTO: 2.15 X10(3)/MCL (ref 0.6–4.6)
LYMPHOCYTES NFR BLD AUTO: 30.7 %
MCH RBC QN AUTO: 25.6 PG
MCHC RBC AUTO-ENTMCNC: 32 G/DL (ref 33–36)
MCV RBC AUTO: 80 FL (ref 80–94)
MONOCYTES # BLD AUTO: 0.67 X10(3)/MCL (ref 0.1–1.3)
MONOCYTES NFR BLD AUTO: 9.6 %
NEUTROPHILS # BLD AUTO: 3.84 X10(3)/MCL (ref 2.1–9.2)
NEUTROPHILS NFR BLD AUTO: 54.7 %
PLATELET # BLD AUTO: 287 X10(3)/MCL (ref 130–400)
PMV BLD AUTO: 8.7 FL (ref 7.4–10.4)
RBC # BLD AUTO: 5.2 X10(6)/MCL (ref 4.2–5.4)
WBC # SPEC AUTO: 7 X10(3)/MCL (ref 4.5–11.5)

## 2023-02-28 PROCEDURE — 85025 COMPLETE CBC W/AUTO DIFF WBC: CPT

## 2023-02-28 PROCEDURE — 36415 COLL VENOUS BLD VENIPUNCTURE: CPT

## 2023-03-08 NOTE — PROGRESS NOTES
HEMATOLOGY / ONCOLOGY   CLINIC NOTE     ONCOLOGICAL HISTORY:     Diagnosis:  - Iron Deficiency Anemia     Treatment History:  - PRBC transfusion     Current Treatment:   -     Subjective:       Chief Complaint: no complaints          HPI      Crystal Lara  67 y.o.  female with past medical history significant for CAD, peripheral vascular disease on low-dose Xarelto, hypertension, osteoarthritis, iron-deficiency anemia and prior GI bleeding in 2021 from a duodenal angiectasia who was admitted for low H&H. She did have an EGD in November 2021 which noted duodenal angiectasia that was treated with APC.  Patient was recently gain admitted in January of 2023 with anemia and noted to have hemoglobin of 7.2 with guaiac being positive.  Patient had an EGD done.    Patient denies any abdominal pain, nausea, vomiting, diarrhea, blood in the bowel movements or any dark bowel movements.  Patient now feeling better and denies any symptoms at the moment.  Patient is supposed to follow up with GI for further workup    Interval History:             Past Medical History:   Diagnosis Date    Coronary artery disease     Hyperlipidemia     Hypertension       Past Surgical History:   Procedure Laterality Date    CAPSULE ENDOSCOPY, ESOPHAGUS THROUGH ILEUM N/A 1/20/2023    Procedure: CAPSULE ENDOSCOPY, ESOPHAGUS THROUGH ILEUM;  Surgeon: Jose Luis Rodriguez MD;  Location: [facility] ENDOSCOPY;  Service: Gastroenterology;  Laterality: N/A;    CARDIAC CATHETERIZATION      CORONARY ANGIOPLASTY      ESOPHAGOGASTRODUODENOSCOPY N/A 1/20/2023    Procedure: EGD/VCE placement;  Surgeon: Jose Luis Rodriguez MD;  Location: [facility] ENDOSCOPY;  Service: Gastroenterology;  Laterality: N/A;     Social History     Socioeconomic History    Marital status: Single   Tobacco Use    Smoking status: Every Day     Packs/day: 0.50     Types: Cigarettes    Smokeless tobacco: Never   Substance and Sexual Activity    Alcohol use: Not Currently    Drug use: Never       Family History   Problem Relation Age of Onset    Heart disease Mother     Heart attack Mother     Heart failure Father       Review of patient's allergies indicates:   Allergen Reactions    Naproxen Swelling      Review of Systems   Constitutional: Negative.  Negative for activity change, chills, fatigue and fever.   HENT: Negative.     Eyes: Negative.    Respiratory:  Negative for cough and shortness of breath.    Cardiovascular:  Negative for chest pain and leg swelling.   Gastrointestinal:  Negative for constipation, diarrhea, nausea and vomiting.   Endocrine: Negative.    Genitourinary: Negative.    Musculoskeletal:  Negative for arthralgias and myalgias.   Integumentary:  Negative.   Allergic/Immunologic: Negative.    Neurological:  Negative for light-headedness, numbness and headaches.   Hematological: Negative.    Psychiatric/Behavioral: Negative.         Objective:        Vitals:    03/09/23 1439   BP: (!) 158/90   Pulse: 67   Resp: 18   Temp: 98 °F (36.7 °C)        Physical Exam  Constitutional:       General: She is not in acute distress.     Appearance: She is well-developed. She is not ill-appearing.   HENT:      Head: Normocephalic and atraumatic.      Mouth/Throat:      Mouth: Mucous membranes are moist.   Eyes:      Extraocular Movements: Extraocular movements intact.      Conjunctiva/sclera: Conjunctivae normal.   Cardiovascular:      Rate and Rhythm: Normal rate and regular rhythm.      Heart sounds: Normal heart sounds.   Pulmonary:      Effort: Pulmonary effort is normal. No respiratory distress.      Breath sounds: Normal breath sounds. No wheezing.   Abdominal:      General: Bowel sounds are normal. There is no distension.      Palpations: Abdomen is soft.      Tenderness: There is no abdominal tenderness.   Musculoskeletal:         General: Normal range of motion.      Cervical back: Normal range of motion and neck supple.   Skin:     General: Skin is warm.   Neurological:      General: No  focal deficit present.      Mental Status: She is alert and oriented to person, place, and time. Mental status is at baseline.      Cranial Nerves: No cranial nerve deficit.   Psychiatric:         Mood and Affect: Mood normal.         LABS / IMAGING            Assessment:     IRON DEFICIENCY ANEMIA:   - Admitted initially in November of 2021 with anemia now noted to have duodenal angiectasia status post APC.  Admitted again with anemia in December of 2022 and then in January of 2023 requiring blood transfusions.  Repeat labs on 02/28/2023 with normal hemoglobin of 13.3  - 01/20/2023 EGD:  Normal esophagus, stomach, duodenum    Plan:     - will repeat workup for anemia and then consider either oral iron or IV iron based on the findings  - pending follow up with GI for further evaluation  - MD / LABS VISIT - 2 WEEKS     The patient was seen, interviewed and examined. Pertinent lab and radiology studies were reviewed. Pt instructed to call should develop concerning signs/symptoms or have further questions.       Portions of the record may have been created with voice recognition software. Occasional wrong-word or sound-a-like substitutions may have occurred due to the inherent limitations of voice recognition software. Read the chart carefully and recognize, using context, where substitutions have occurred.    Idania Orona MD  Hematology / Oncology

## 2023-03-09 ENCOUNTER — OFFICE VISIT (OUTPATIENT)
Dept: HEMATOLOGY/ONCOLOGY | Facility: CLINIC | Age: 68
End: 2023-03-09
Payer: MEDICARE

## 2023-03-09 VITALS
TEMPERATURE: 98 F | DIASTOLIC BLOOD PRESSURE: 90 MMHG | WEIGHT: 135.13 LBS | HEIGHT: 61 IN | BODY MASS INDEX: 25.51 KG/M2 | HEART RATE: 67 BPM | OXYGEN SATURATION: 97 % | SYSTOLIC BLOOD PRESSURE: 158 MMHG | RESPIRATION RATE: 18 BRPM

## 2023-03-09 DIAGNOSIS — D50.0 IRON DEFICIENCY ANEMIA DUE TO CHRONIC BLOOD LOSS: ICD-10-CM

## 2023-03-09 DIAGNOSIS — D50.9 IRON (FE) DEFICIENCY ANEMIA: ICD-10-CM

## 2023-03-09 PROCEDURE — 1101F PT FALLS ASSESS-DOCD LE1/YR: CPT | Mod: CPTII,,, | Performed by: INTERNAL MEDICINE

## 2023-03-09 PROCEDURE — 3077F PR MOST RECENT SYSTOLIC BLOOD PRESSURE >= 140 MM HG: ICD-10-PCS | Mod: CPTII,,, | Performed by: INTERNAL MEDICINE

## 2023-03-09 PROCEDURE — 4010F ACE/ARB THERAPY RXD/TAKEN: CPT | Mod: CPTII,,, | Performed by: INTERNAL MEDICINE

## 2023-03-09 PROCEDURE — 3008F PR BODY MASS INDEX (BMI) DOCUMENTED: ICD-10-PCS | Mod: CPTII,,, | Performed by: INTERNAL MEDICINE

## 2023-03-09 PROCEDURE — 1126F PR PAIN SEVERITY QUANTIFIED, NO PAIN PRESENT: ICD-10-PCS | Mod: CPTII,,, | Performed by: INTERNAL MEDICINE

## 2023-03-09 PROCEDURE — 3080F DIAST BP >= 90 MM HG: CPT | Mod: CPTII,,, | Performed by: INTERNAL MEDICINE

## 2023-03-09 PROCEDURE — 99203 PR OFFICE/OUTPT VISIT, NEW, LEVL III, 30-44 MIN: ICD-10-PCS | Mod: ,,, | Performed by: INTERNAL MEDICINE

## 2023-03-09 PROCEDURE — 1126F AMNT PAIN NOTED NONE PRSNT: CPT | Mod: CPTII,,, | Performed by: INTERNAL MEDICINE

## 2023-03-09 PROCEDURE — 3008F BODY MASS INDEX DOCD: CPT | Mod: CPTII,,, | Performed by: INTERNAL MEDICINE

## 2023-03-09 PROCEDURE — 1159F PR MEDICATION LIST DOCUMENTED IN MEDICAL RECORD: ICD-10-PCS | Mod: CPTII,,, | Performed by: INTERNAL MEDICINE

## 2023-03-09 PROCEDURE — 3080F PR MOST RECENT DIASTOLIC BLOOD PRESSURE >= 90 MM HG: ICD-10-PCS | Mod: CPTII,,, | Performed by: INTERNAL MEDICINE

## 2023-03-09 PROCEDURE — 1101F PR PT FALLS ASSESS DOC 0-1 FALLS W/OUT INJ PAST YR: ICD-10-PCS | Mod: CPTII,,, | Performed by: INTERNAL MEDICINE

## 2023-03-09 PROCEDURE — 3288F FALL RISK ASSESSMENT DOCD: CPT | Mod: CPTII,,, | Performed by: INTERNAL MEDICINE

## 2023-03-09 PROCEDURE — 1159F MED LIST DOCD IN RCRD: CPT | Mod: CPTII,,, | Performed by: INTERNAL MEDICINE

## 2023-03-09 PROCEDURE — 4010F PR ACE/ARB THEARPY RXD/TAKEN: ICD-10-PCS | Mod: CPTII,,, | Performed by: INTERNAL MEDICINE

## 2023-03-09 PROCEDURE — 99203 OFFICE O/P NEW LOW 30 MIN: CPT | Mod: ,,, | Performed by: INTERNAL MEDICINE

## 2023-03-09 PROCEDURE — 3077F SYST BP >= 140 MM HG: CPT | Mod: CPTII,,, | Performed by: INTERNAL MEDICINE

## 2023-03-09 PROCEDURE — 3288F PR FALLS RISK ASSESSMENT DOCUMENTED: ICD-10-PCS | Mod: CPTII,,, | Performed by: INTERNAL MEDICINE

## 2023-03-09 RX ORDER — PNV NO.95/FERROUS FUM/FOLIC AC 28MG-0.8MG
TABLET ORAL
COMMUNITY

## 2023-03-29 ENCOUNTER — OFFICE VISIT (OUTPATIENT)
Dept: HEMATOLOGY/ONCOLOGY | Facility: CLINIC | Age: 68
End: 2023-03-29
Payer: MEDICARE

## 2023-03-29 VITALS
RESPIRATION RATE: 18 BRPM | BODY MASS INDEX: 25.42 KG/M2 | OXYGEN SATURATION: 99 % | HEART RATE: 64 BPM | DIASTOLIC BLOOD PRESSURE: 82 MMHG | HEIGHT: 61 IN | SYSTOLIC BLOOD PRESSURE: 159 MMHG | TEMPERATURE: 98 F | WEIGHT: 134.63 LBS

## 2023-03-29 DIAGNOSIS — D50.0 IRON DEFICIENCY ANEMIA DUE TO CHRONIC BLOOD LOSS: Primary | ICD-10-CM

## 2023-03-29 PROCEDURE — 3079F DIAST BP 80-89 MM HG: CPT | Mod: CPTII,,, | Performed by: STUDENT IN AN ORGANIZED HEALTH CARE EDUCATION/TRAINING PROGRAM

## 2023-03-29 PROCEDURE — 3288F FALL RISK ASSESSMENT DOCD: CPT | Mod: CPTII,,, | Performed by: STUDENT IN AN ORGANIZED HEALTH CARE EDUCATION/TRAINING PROGRAM

## 2023-03-29 PROCEDURE — 4010F PR ACE/ARB THEARPY RXD/TAKEN: ICD-10-PCS | Mod: CPTII,,, | Performed by: STUDENT IN AN ORGANIZED HEALTH CARE EDUCATION/TRAINING PROGRAM

## 2023-03-29 PROCEDURE — 1159F MED LIST DOCD IN RCRD: CPT | Mod: CPTII,,, | Performed by: STUDENT IN AN ORGANIZED HEALTH CARE EDUCATION/TRAINING PROGRAM

## 2023-03-29 PROCEDURE — 3079F PR MOST RECENT DIASTOLIC BLOOD PRESSURE 80-89 MM HG: ICD-10-PCS | Mod: CPTII,,, | Performed by: STUDENT IN AN ORGANIZED HEALTH CARE EDUCATION/TRAINING PROGRAM

## 2023-03-29 PROCEDURE — 1159F PR MEDICATION LIST DOCUMENTED IN MEDICAL RECORD: ICD-10-PCS | Mod: CPTII,,, | Performed by: STUDENT IN AN ORGANIZED HEALTH CARE EDUCATION/TRAINING PROGRAM

## 2023-03-29 PROCEDURE — 3288F PR FALLS RISK ASSESSMENT DOCUMENTED: ICD-10-PCS | Mod: CPTII,,, | Performed by: STUDENT IN AN ORGANIZED HEALTH CARE EDUCATION/TRAINING PROGRAM

## 2023-03-29 PROCEDURE — 3008F BODY MASS INDEX DOCD: CPT | Mod: CPTII,,, | Performed by: STUDENT IN AN ORGANIZED HEALTH CARE EDUCATION/TRAINING PROGRAM

## 2023-03-29 PROCEDURE — 3077F SYST BP >= 140 MM HG: CPT | Mod: CPTII,,, | Performed by: STUDENT IN AN ORGANIZED HEALTH CARE EDUCATION/TRAINING PROGRAM

## 2023-03-29 PROCEDURE — 99214 PR OFFICE/OUTPT VISIT, EST, LEVL IV, 30-39 MIN: ICD-10-PCS | Mod: ,,, | Performed by: STUDENT IN AN ORGANIZED HEALTH CARE EDUCATION/TRAINING PROGRAM

## 2023-03-29 PROCEDURE — 3008F PR BODY MASS INDEX (BMI) DOCUMENTED: ICD-10-PCS | Mod: CPTII,,, | Performed by: STUDENT IN AN ORGANIZED HEALTH CARE EDUCATION/TRAINING PROGRAM

## 2023-03-29 PROCEDURE — 1101F PR PT FALLS ASSESS DOC 0-1 FALLS W/OUT INJ PAST YR: ICD-10-PCS | Mod: CPTII,,, | Performed by: STUDENT IN AN ORGANIZED HEALTH CARE EDUCATION/TRAINING PROGRAM

## 2023-03-29 PROCEDURE — 4010F ACE/ARB THERAPY RXD/TAKEN: CPT | Mod: CPTII,,, | Performed by: STUDENT IN AN ORGANIZED HEALTH CARE EDUCATION/TRAINING PROGRAM

## 2023-03-29 PROCEDURE — 99214 OFFICE O/P EST MOD 30 MIN: CPT | Mod: ,,, | Performed by: STUDENT IN AN ORGANIZED HEALTH CARE EDUCATION/TRAINING PROGRAM

## 2023-03-29 PROCEDURE — 1126F AMNT PAIN NOTED NONE PRSNT: CPT | Mod: CPTII,,, | Performed by: STUDENT IN AN ORGANIZED HEALTH CARE EDUCATION/TRAINING PROGRAM

## 2023-03-29 PROCEDURE — 1126F PR PAIN SEVERITY QUANTIFIED, NO PAIN PRESENT: ICD-10-PCS | Mod: CPTII,,, | Performed by: STUDENT IN AN ORGANIZED HEALTH CARE EDUCATION/TRAINING PROGRAM

## 2023-03-29 PROCEDURE — 3077F PR MOST RECENT SYSTOLIC BLOOD PRESSURE >= 140 MM HG: ICD-10-PCS | Mod: CPTII,,, | Performed by: STUDENT IN AN ORGANIZED HEALTH CARE EDUCATION/TRAINING PROGRAM

## 2023-03-29 PROCEDURE — 1101F PT FALLS ASSESS-DOCD LE1/YR: CPT | Mod: CPTII,,, | Performed by: STUDENT IN AN ORGANIZED HEALTH CARE EDUCATION/TRAINING PROGRAM

## 2023-03-29 NOTE — PROGRESS NOTES
HEMATOLOGY / ONCOLOGY   CLINIC NOTE     ONCOLOGICAL HISTORY:     Previous hematologist/oncologist: Dr. Orona    Diagnosis:  - Iron Deficiency Anemia     Treatment History:  - PRBC transfusion     Current Treatment:   -     Subjective:       Chief Complaint: no complaints          HPI      Crystal Lara  67 y.o.  female with past medical history significant for CAD, peripheral vascular disease on low-dose Xarelto, hypertension, osteoarthritis, iron-deficiency anemia and prior GI bleeding in 2021 from a duodenal angiectasia who was admitted for low H&H. She did have an EGD in November 2021 which noted duodenal angiectasia that was treated with APC.  Patient was recently gain admitted in January of 2023 with anemia and noted to have hemoglobin of 7.2 with guaiac being positive.  Patient had an EGD done.    Patient denies any abdominal pain, nausea, vomiting, diarrhea, blood in the bowel movements or any dark bowel movements.  Patient now feeling better and denies any symptoms at the moment.  Patient is supposed to follow up with GI for further workup    Interval History:     Today, 03/29/2023, patient denies any acute concerns today.  She denies any fevers, chills, drenching night sweats, decreased appetite, weight loss.  She denies any blood in his stools, dark tarry stools, easy bruising or bleeding.  She denies any chest pain, shortness of breath, fatigue, headaches/dizziness.        Past Medical History:   Diagnosis Date    Coronary artery disease     Hyperlipidemia     Hypertension       Past Surgical History:   Procedure Laterality Date    CAPSULE ENDOSCOPY, ESOPHAGUS THROUGH ILEUM N/A 1/20/2023    Procedure: CAPSULE ENDOSCOPY, ESOPHAGUS THROUGH ILEUM;  Surgeon: Jose Luis Rodriguez MD;  Location: [facility] ENDOSCOPY;  Service: Gastroenterology;  Laterality: N/A;    CARDIAC CATHETERIZATION      CORONARY ANGIOPLASTY      ESOPHAGOGASTRODUODENOSCOPY N/A 1/20/2023    Procedure: EGD/VCE placement;  Surgeon: Jose Luis COHEN  MD Jennifer;  Location: [facility] ENDOSCOPY;  Service: Gastroenterology;  Laterality: N/A;     Social History     Socioeconomic History    Marital status: Single   Tobacco Use    Smoking status: Every Day     Packs/day: 0.50     Types: Cigarettes    Smokeless tobacco: Never   Substance and Sexual Activity    Alcohol use: Not Currently    Drug use: Never      Family History   Problem Relation Age of Onset    Heart disease Mother     Heart attack Mother     Heart failure Father       Review of patient's allergies indicates:   Allergen Reactions    Naproxen Swelling      Review of systems  CONSTITUTIONAL: no fevers, no chills, no weight loss, no fatigue, no weakness  HEMATOLOGIC: no abnormal bleeding, no abnormal bruising, no drenching night sweats  ONCOLOGIC: no new masses or lumps  HEENT: no vision loss, no tinnitus or hearing loss, no nose bleeding, no dysphagia, no odynophagia  CVS: no chest pain, no palpitations, no dyspnea on exertion  RESP: no shortness of breath, no hemoptysis, no cough  GI: no nausea, no vomiting, no diarrhea, no constipation, no melena, no hematochezia, no hematemesis, no abdominal pain, no increase in abdominal girth  : no dysuria, no hematuria, no hesitancy, no scrotal swelling, no discharge  INTEGUMENT: no rashes, no abnormal bruising, no nail pitting, no hyperpigmentation  NEURO: no falls, no memory loss, no paresthesias or dysesthesias, no urofecal incontinence or retention, no loss of strength on any extremity  MSK: no back pain, no new joint pain, no joint swelling  PSYCH: no suicidal or homicidal ideation, no depression, no insomnia, no anhedonia  ENDOCRINE: no heat or cold intolerance, no polyuria, no polydipsia        Objective:        Vitals:    03/29/23 1421   BP: (!) 159/82   Pulse: 64   Resp: 18   Temp: 97.8 °F (36.6 °C)          Physical Exam:  ECOG PS 0  GA: AAOx3, NAD  HEENT: NCAT, PERRLA, EOMI, good dentition, moist oral mucous membranes  LYMPH: no cervical, axillary or  supraclavicular adenopathy  CVS: s1s2 RRR, no M/R/G  RESP: CTA b/l, no crackles, no wheezes or rhonchi  ABD: soft, NT, ND, BS+, no hepatosplenomegaly  EXT: no deformities, no pedal edema  SKIN: no rashes, warm and dry  NEURO: normal mentation, strength 5/5 on all 4 extremities, no sensory deficits        LABS / IMAGING      03/27/2023:  Creatinine 0.7, albumin 3.4, calcium 9.1, LFTs within normal limits, iron 40, TIBC 324,% saturation 12, ferritin 53, WBC count 8.6, hemoglobin 12.3, MCV 82.5, platelet count 265, normal differential.    03/09/2023:  Creatinine 0.79, albumin 3.6, LFTs within normal limits, iron 49, TIBC 313, % saturation 16, ferritin 54, folic acid 12.9, vitamin B12 343, CRP 0.2, WBC count 7.5, hemoglobin 12.0, MCV 80.7, platelet count 309.  Retic count 1.01.  Sedimentation rate 22.  ZENON not detected.  Copper 129.4.  Erythropoietin 10.        Assessment:     IRON DEFICIENCY ANEMIA:   - Admitted initially in November of 2021 with anemia now noted to have duodenal angiectasia status post APC.  Admitted again with anemia in December of 2022 and then in January of 2023 requiring blood transfusions.  Repeat labs on 02/28/2023 with normal hemoglobin of 13.3  - 01/20/2023 EGD:  Normal esophagus, stomach, duodenum    Plan:     Reviewed repeat labs with normal hemoglobin level and normal ferritin.  Noted low normal iron saturation   Patient was supposed to be followed by GI for repeat push enteroscopy which she is yet to be scheduled for.    Counseled patient regarding reaching out to her gastroenterologist to schedule her procedure to reduce the probability of further episodes of GI bleeding.  Patient and daughter agree with this plan of care.  Plan to follow-up in 3 months with repeat labs including iron panel.        A total of  30 minutes were spent in review of records, interpretation of test, coordination of care, discussion and counseling with the patient.      Portions of the record may have been  created with voice recognition software. Occasional wrong-word or sound-a-like substitutions may have occurred due to the inherent limitations of voice recognition software. Read the chart carefully and recognize, using context, where substitutions have occurred.

## 2023-04-24 PROBLEM — K92.2 GASTROINTESTINAL HEMORRHAGE: Status: RESOLVED | Noted: 2022-07-07 | Resolved: 2023-04-24

## 2023-06-23 NOTE — PROGRESS NOTES
CHIEF COMPLAINT:   Chief Complaint   Patient presents with    f/u denies chest  pain or sob since LV no questions                    Review of patient's allergies indicates:   Allergen Reactions    Naproxen Swelling                                          HPI:  Crystal Lara 67 y.o. female with a past medical history of CAD, PAD, HLD, and Tobacco Use who presents for routine 6 month follow up and ongoing care.  She was noted to have a hospitalization in January 2022 with GI bleed and is reportedly still awaiting a follow-up appointment to be schedule with GI outpatient.    Today the patient denies any cardiac complaints of chest pain, shortness of breath, palpitations, orthopnea, claudication symptoms, peripheral edema or syncope.  She reports a previous episode of lightheadedness/dizziness that she attributes to anemia.  She denies any further episodes or acute bleeding issues.  She states she is able to perform her normal ADLs and housework without CP or SOB.  She reports compliance with her current medications.  She continues to smoke approximately 5-6 cigarettes a day and states she is not ready to quit at this time due to increased stress.  Unfortunately,  the patient reports that her sister recently passed away.  She continues to follow with Dr. Alvarez for PAD.      Background:  At office visit on 11.10.21, patient's right lower extremity DP/PT was nondopplerable or palpable. Dr. Samson was contacted and was going to perform angiogram on 11.11.21. Patient arrived at [facility] 11.11.21 and was found to be anemic with hemoglobin of 6.8 and was transfused 2 units of PRBC. 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 Colonoscopy revealed small internal hemorrhoids and a few small diverticula in the sigmoid colon. Arterial ultrasound was performed which revealed severe decrease in arterial flow in the right lower extremity. Ultrasound was negative for DVT. 12.17.21 common  femoral endarterectomy was performed. Echo revealed LVEF 45%.    Hospitalization with GI Bleed in Dec. 2022 and January 2023                                                                                                                                                                                                Patient Active Problem List   Diagnosis    PAD (peripheral artery disease)    Hyperlipidemia LDL goal <70    Hypertension    Smoking    Symptomatic anemia    Tobacco use    Iron deficiency anemia due to chronic blood loss     Past Surgical History:   Procedure Laterality Date    CAPSULE ENDOSCOPY, ESOPHAGUS THROUGH ILEUM N/A 1/20/2023    Procedure: CAPSULE ENDOSCOPY, ESOPHAGUS THROUGH ILEUM;  Surgeon: Jose Luis Rodriguez MD;  Location: [facility] ENDOSCOPY;  Service: Gastroenterology;  Laterality: N/A;    CARDIAC CATHETERIZATION      CORONARY ANGIOPLASTY      ESOPHAGOGASTRODUODENOSCOPY N/A 1/20/2023    Procedure: EGD/VCE placement;  Surgeon: Jose Luis Rodriguez MD;  Location: [facility] ENDOSCOPY;  Service: Gastroenterology;  Laterality: N/A;     Social History     Socioeconomic History    Marital status: Single   Tobacco Use    Smoking status: Every Day     Packs/day: 0.50     Types: Cigarettes    Smokeless tobacco: Never   Substance and Sexual Activity    Alcohol use: Not Currently    Drug use: Never        Family History   Problem Relation Age of Onset    Heart disease Mother     Heart attack Mother     Heart failure Father          Current Outpatient Medications:     amlodipine-benazepril 5-10 mg (LOTREL) 5-10 mg per capsule, Take 1 capsule by mouth Daily., Disp: 90 capsule, Rfl: 6    aspirin (ECOTRIN) 81 MG EC tablet, Take 1 tablet by mouth Daily., Disp: , Rfl:     atorvastatin (LIPITOR) 40 MG tablet, Take 1 tablet (40 mg total) by mouth nightly., Disp: 90 tablet, Rfl: 6    calcium carbonate-vitamin D3 500 mg-10 mcg (400 unit) Tab, 1 tablet with a meal, Disp: , Rfl:     cyclobenzaprine (FLEXERIL) 10 MG tablet,  "Take 10 mg by mouth as needed., Disp: , Rfl:     docusate sodium 100 mg capsule, Take 1 tablet by mouth every other day., Disp: , Rfl:     esomeprazole magnesium 20 mg TbEC, Take 1 capsule by mouth nightly., Disp: , Rfl:     ezetimibe (ZETIA) 10 mg tablet, Take 1 tablet (10 mg total) by mouth Daily., Disp: 90 tablet, Rfl: 6    metoprolol tartrate (LOPRESSOR) 25 MG tablet, Take 0.5 tablets (12.5 mg total) by mouth 2 (two) times a day., Disp: 90 tablet, Rfl: 6    rivaroxaban (XARELTO) 2.5 mg Tab, Take 1 tablet (2.5 mg total) by mouth 2 (two) times a day., Disp: 90 tablet, Rfl: 6    diclofenac sodium (VOLTAREN) 1 % Gel, Apply 2 g topically 2 (two) times daily., Disp: 50 g, Rfl: 1     ROS:                                                                                                                                                                             Review of Systems   Constitutional: Negative.    Respiratory: Negative.     Cardiovascular: Negative.    Gastrointestinal: Negative.    Musculoskeletal: Negative.    Skin: Negative.    Neurological: Negative.    Psychiatric/Behavioral: Negative.        Blood pressure (!) 149/79, pulse (!) 55, temperature 97.6 °F (36.4 °C), temperature source Oral, resp. rate 20, height 5' 1" (1.549 m), weight 55.8 kg (123 lb 0.3 oz), SpO2 99 %.   PE:  Physical Exam  Constitutional:       Appearance: Normal appearance.   HENT:      Head: Normocephalic and atraumatic.   Eyes:      Extraocular Movements: Extraocular movements intact.      Pupils: Pupils are equal, round, and reactive to light.   Cardiovascular:      Rate and Rhythm: Normal rate and regular rhythm.   Pulmonary:      Effort: Pulmonary effort is normal.      Breath sounds: Normal breath sounds.   Abdominal:      Palpations: Abdomen is soft.   Musculoskeletal:         General: Normal range of motion.      Cervical back: Normal range of motion.   Skin:     General: Skin is warm and dry.   Neurological:      General: No " focal deficit present.      Mental Status: She is alert and oriented to person, place, and time.   Psychiatric:         Mood and Affect: Mood normal.         Behavior: Behavior normal.        ASSESSMENT/PLAN:  Nonobstructive CAD  - LHC revealed LAD 50% stenosis, left circumflex 30% proximal stenosis, RCA 40% stenosis with normal LVEF 60%. Admitted for medical management at the time and to consider FFR of LAD if patient develops recurrent angina. (4.13.16)  - Denies any cardiac complaints   - Continue Aspirin, Atorvastatin, Benazepril, and Metoprolol Tartrate 12.5 mg BID  - Counseled on heart healthy diet, exercise as tolerated, and smoking cessation    PAD  - arterial ultrasound was performed which revealed severe decrease in arterial flow in the right lower extremity. Ultrasound was negative for DVT. (11.11.21)   -  common femoral endarterectomy was performed. Echo revealed LVEF 45%. (12.17.21)  - Continue ASA  - Defer recommendations on continuation of Xarelto to Dr. Alvarez who patient currently follows for the PAD    Hypertension  - BP above goal  - Continue Metoprolol Tartrate. Increase Lotrel to 10-20 mg for better BP control   - Nurse visit in 2-3 weeks for BP/HR check   - BMP/FLP  in 2 weeks   - Counseled on low-salt diet and exercise as tolerated    HLD  - LDL at goal per labs with PCP - 56 in July 2022   - Continue Lipitor 40 mg and Zetia 10mg. Reports intolerance to lipitor 80mg in the past  - Advised on heart healthy, low-fat low-cholesterol diet and exercise as tolerated    Lightheadedness/dizziness  - reports previous episode of lightheadedness/dizziness, may be related to acute anemia.  However considering the patient's history will order carotid ultrasound to rule out any obstructive stenosis    Tobacco Use  - Patient currently smokes 5-6 cigarettes per day and states she is not ready to quit at this time  - Counseled the importance of smoking cessation      Hx of GI bleed in Dec. 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     Hospitalization with GI Bleed in Dec. 2022 and January 2023  - Continue to Follow with GI clinic as directed     Anemia  - Continue management per hematology     Carotid US  Increase amlodipine/benazepril to 10 mg-20 mg for better BP control  Nurse visit in 2- 3 weeks for BP/HR check  FLP/BMP in two weeks   Follow up in Cardiology Clinic in 6 months or sooner if needed   Follow-up with PCP, GI, and hematology and Dr. Alvarez as directed

## 2023-07-03 DIAGNOSIS — D50.0 IRON DEFICIENCY ANEMIA DUE TO CHRONIC BLOOD LOSS: Primary | ICD-10-CM

## 2023-07-05 ENCOUNTER — OFFICE VISIT (OUTPATIENT)
Dept: CARDIOLOGY | Facility: CLINIC | Age: 68
End: 2023-07-05
Payer: MEDICARE

## 2023-07-05 VITALS
OXYGEN SATURATION: 99 % | DIASTOLIC BLOOD PRESSURE: 86 MMHG | HEART RATE: 55 BPM | WEIGHT: 123 LBS | TEMPERATURE: 98 F | BODY MASS INDEX: 23.22 KG/M2 | RESPIRATION RATE: 20 BRPM | HEIGHT: 61 IN | SYSTOLIC BLOOD PRESSURE: 170 MMHG

## 2023-07-05 DIAGNOSIS — E78.5 HYPERLIPIDEMIA LDL GOAL <70: ICD-10-CM

## 2023-07-05 DIAGNOSIS — I73.9 PAD (PERIPHERAL ARTERY DISEASE): ICD-10-CM

## 2023-07-05 DIAGNOSIS — I10 HYPERTENSION, UNSPECIFIED TYPE: ICD-10-CM

## 2023-07-05 DIAGNOSIS — R42 POSTURAL DIZZINESS WITH PRESYNCOPE: ICD-10-CM

## 2023-07-05 DIAGNOSIS — I25.10 CORONARY ARTERY DISEASE INVOLVING NATIVE CORONARY ARTERY OF NATIVE HEART WITHOUT ANGINA PECTORIS: Primary | ICD-10-CM

## 2023-07-05 DIAGNOSIS — D64.9 SYMPTOMATIC ANEMIA: ICD-10-CM

## 2023-07-05 DIAGNOSIS — Z72.0 TOBACCO USE: ICD-10-CM

## 2023-07-05 DIAGNOSIS — R55 POSTURAL DIZZINESS WITH PRESYNCOPE: ICD-10-CM

## 2023-07-05 DIAGNOSIS — I10 PRIMARY HYPERTENSION: ICD-10-CM

## 2023-07-05 PROCEDURE — 3079F DIAST BP 80-89 MM HG: CPT | Mod: CPTII,,, | Performed by: NURSE PRACTITIONER

## 2023-07-05 PROCEDURE — 1160F PR REVIEW ALL MEDS BY PRESCRIBER/CLIN PHARMACIST DOCUMENTED: ICD-10-PCS | Mod: CPTII,,, | Performed by: NURSE PRACTITIONER

## 2023-07-05 PROCEDURE — 1101F PR PT FALLS ASSESS DOC 0-1 FALLS W/OUT INJ PAST YR: ICD-10-PCS | Mod: CPTII,,, | Performed by: NURSE PRACTITIONER

## 2023-07-05 PROCEDURE — 99214 PR OFFICE/OUTPT VISIT, EST, LEVL IV, 30-39 MIN: ICD-10-PCS | Mod: S$PBB,,, | Performed by: NURSE PRACTITIONER

## 2023-07-05 PROCEDURE — 3077F PR MOST RECENT SYSTOLIC BLOOD PRESSURE >= 140 MM HG: ICD-10-PCS | Mod: CPTII,,, | Performed by: NURSE PRACTITIONER

## 2023-07-05 PROCEDURE — 99215 OFFICE O/P EST HI 40 MIN: CPT | Mod: PBBFAC | Performed by: NURSE PRACTITIONER

## 2023-07-05 PROCEDURE — 3008F BODY MASS INDEX DOCD: CPT | Mod: CPTII,,, | Performed by: NURSE PRACTITIONER

## 2023-07-05 PROCEDURE — 4010F PR ACE/ARB THEARPY RXD/TAKEN: ICD-10-PCS | Mod: CPTII,,, | Performed by: NURSE PRACTITIONER

## 2023-07-05 PROCEDURE — 3288F PR FALLS RISK ASSESSMENT DOCUMENTED: ICD-10-PCS | Mod: CPTII,,, | Performed by: NURSE PRACTITIONER

## 2023-07-05 PROCEDURE — 99214 OFFICE O/P EST MOD 30 MIN: CPT | Mod: S$PBB,,, | Performed by: NURSE PRACTITIONER

## 2023-07-05 PROCEDURE — 1101F PT FALLS ASSESS-DOCD LE1/YR: CPT | Mod: CPTII,,, | Performed by: NURSE PRACTITIONER

## 2023-07-05 PROCEDURE — 3077F SYST BP >= 140 MM HG: CPT | Mod: CPTII,,, | Performed by: NURSE PRACTITIONER

## 2023-07-05 PROCEDURE — 1159F MED LIST DOCD IN RCRD: CPT | Mod: CPTII,,, | Performed by: NURSE PRACTITIONER

## 2023-07-05 PROCEDURE — 1159F PR MEDICATION LIST DOCUMENTED IN MEDICAL RECORD: ICD-10-PCS | Mod: CPTII,,, | Performed by: NURSE PRACTITIONER

## 2023-07-05 PROCEDURE — 1160F RVW MEDS BY RX/DR IN RCRD: CPT | Mod: CPTII,,, | Performed by: NURSE PRACTITIONER

## 2023-07-05 PROCEDURE — 3288F FALL RISK ASSESSMENT DOCD: CPT | Mod: CPTII,,, | Performed by: NURSE PRACTITIONER

## 2023-07-05 PROCEDURE — 4010F ACE/ARB THERAPY RXD/TAKEN: CPT | Mod: CPTII,,, | Performed by: NURSE PRACTITIONER

## 2023-07-05 PROCEDURE — 3008F PR BODY MASS INDEX (BMI) DOCUMENTED: ICD-10-PCS | Mod: CPTII,,, | Performed by: NURSE PRACTITIONER

## 2023-07-05 PROCEDURE — 3079F PR MOST RECENT DIASTOLIC BLOOD PRESSURE 80-89 MM HG: ICD-10-PCS | Mod: CPTII,,, | Performed by: NURSE PRACTITIONER

## 2023-07-05 RX ORDER — AMLODIPINE AND BENAZEPRIL HYDROCHLORIDE 10; 20 MG/1; MG/1
1 CAPSULE ORAL DAILY
Qty: 90 CAPSULE | Refills: 3 | Status: SHIPPED | OUTPATIENT
Start: 2023-07-05 | End: 2023-07-18 | Stop reason: SDUPTHER

## 2023-07-05 NOTE — PATIENT INSTRUCTIONS
Carotid US  Increase amlodipine/benazepril to 10 mg-20 mg for better BP control  Nurse visit in 2- 3 weeks for BP/HR check  FLP/BMP in two weeks   Follow up in Cardiology Clinic in 6 months or sooner if needed   Follow-up with PCP, GI, and hematology and Dr. Alvarez as directed

## 2023-07-18 DIAGNOSIS — I10 HYPERTENSION, UNSPECIFIED TYPE: ICD-10-CM

## 2023-07-18 DIAGNOSIS — I25.10 CORONARY ARTERY DISEASE, UNSPECIFIED VESSEL OR LESION TYPE, UNSPECIFIED WHETHER ANGINA PRESENT, UNSPECIFIED WHETHER NATIVE OR TRANSPLANTED HEART: ICD-10-CM

## 2023-07-18 DIAGNOSIS — E78.5 HYPERLIPIDEMIA LDL GOAL <70: ICD-10-CM

## 2023-07-18 DIAGNOSIS — I73.9 PAD (PERIPHERAL ARTERY DISEASE): ICD-10-CM

## 2023-07-18 RX ORDER — EZETIMIBE 10 MG/1
10 TABLET ORAL DAILY
Qty: 90 TABLET | Refills: 3 | Status: SHIPPED | OUTPATIENT
Start: 2023-07-18 | End: 2024-07-17

## 2023-07-18 RX ORDER — AMLODIPINE AND BENAZEPRIL HYDROCHLORIDE 10; 20 MG/1; MG/1
1 CAPSULE ORAL DAILY
Qty: 90 CAPSULE | Refills: 3 | Status: SHIPPED | OUTPATIENT
Start: 2023-07-18 | End: 2024-07-17

## 2023-07-18 RX ORDER — RIVAROXABAN 2.5 MG/1
1 TABLET, FILM COATED ORAL 2 TIMES DAILY
Qty: 60 TABLET | Refills: 0 | Status: SHIPPED | OUTPATIENT
Start: 2023-07-18 | End: 2023-10-27 | Stop reason: SDUPTHER

## 2023-07-18 RX ORDER — METOPROLOL TARTRATE 25 MG/1
12.5 TABLET, FILM COATED ORAL 2 TIMES DAILY
Qty: 90 TABLET | Refills: 3 | Status: SHIPPED | OUTPATIENT
Start: 2023-07-18 | End: 2024-07-17

## 2023-07-18 RX ORDER — ATORVASTATIN CALCIUM 40 MG/1
40 TABLET, FILM COATED ORAL NIGHTLY
Qty: 90 TABLET | Refills: 3 | Status: SHIPPED | OUTPATIENT
Start: 2023-07-18 | End: 2024-07-17

## 2023-07-18 RX ORDER — ASPIRIN 81 MG/1
81 TABLET ORAL DAILY
Qty: 90 TABLET | Refills: 3 | Status: SHIPPED | OUTPATIENT
Start: 2023-07-18

## 2023-07-27 ENCOUNTER — TELEPHONE (OUTPATIENT)
Dept: CARDIOLOGY | Facility: CLINIC | Age: 68
End: 2023-07-27
Payer: MEDICARE

## 2023-08-02 NOTE — TELEPHONE ENCOUNTER
Patient present BP log to clinic. She missed her appointment for a BP check. Patient to complete labs on 7/28/23 but was unable to to car issues. She states she will complete at US appointment on Monday. Patient will chance how she takes her medication taking metoprolol in the morning, amlodipine/benazepril at noon and metoprolol again before bed. Patient verbalized understanding.

## 2023-08-09 NOTE — PROGRESS NOTES
[facility] Vascular - Clinic Note  Magdaleno Alvarez MD      Patient Name: Crystal Lara                   : 1955      MRN: [id number]   Visit Date: 8/10/2023       History Present Illness     Reason for Visit: Follow-up       Ms. Lara presents to the clinic for 1 year evaluation of lower extremity symptoms. She is s/p right common femoral endarterectomy 21. She denies claudication with ambulation. She denies bilateral lower extremity wounds.  She states recent blood transfusions due to low blood counts.    Reports needing to obtain carotid testing but has had difficulty with her insurance.      REVIEW OF SYSTEMS:  ROS  12 point review of systems conducted, negative except as stated in the history of present illness. See HPI for details.        Physical Exam      Visit Vitals  /72   Pulse (!) 55         General: well-nourished, no acute distress, and healthy appearing, ambulating normally, alert, pleasant, conversant, and oriented  Neurologic: cranial nerves are grossly intact, no neurologic deficits  HEENT: grossly normal and no scleral icterus  Neck/Chest: normal , soft without lymphadenopathy, and palpable carotid pulses bilaterally  Respiratory: breathing easily and without respiratory distress  Abdomen: normal and soft  Cardiology: regular rate and rhythm    Upper Extremity Arterial Exam:   Right - radial is palpable  Left - radial is non-palpable    Lower Extremity Arterial Exam:  Right - posterior tibial is palpable and dorsalis pedis is non-palpable  Left - posterior tibial is palpable and dorsalis pedis is non-palpable    Musculoskeletal:   Lower Extremity: no edema present to bilateral lower extremities and feet are warm bilaterally    Skin: has no obvious skin abnormality to upper extremities and lower extremities              Assessment and Plan     Ms. Lara is a 67 y.o. with lower extremity arterial disease who is s/p right common femoral endarterectomy 21. She denies  claudication with ambulation and is able to maintain an active lifestyle. She has palpable posterior tibial pulses bilaterally.  She doesn't need any procedural intervention at this time. Will follow up in 1 year for evaluation of symptoms.   Recommend  continue aspirin and statin therapy.    We discussed the detrimental effects of tobacco use on the vascular system and overall health. There was an emphasis on achieving complete cessation. We also discussed the addition of pharmacologic adjuncts.      1. PAD (peripheral artery disease)    2. Tobacco use           Imaging Obtained/Reviewed   Study: none  Date:           Medical History     Past Medical History:   Diagnosis Date    Coronary artery disease     Hyperlipidemia     Hypertension     PAD (peripheral artery disease)      Past Surgical History:   Procedure Laterality Date    CAPSULE ENDOSCOPY, ESOPHAGUS THROUGH ILEUM N/A 01/20/2023    Procedure: CAPSULE ENDOSCOPY, ESOPHAGUS THROUGH ILEUM;  Surgeon: Jose Luis Rodriguez MD;  Location: [facility] ENDOSCOPY;  Service: Gastroenterology;  Laterality: N/A;    CARDIAC CATHETERIZATION      CORONARY ANGIOPLASTY      ENDARTERECTOMY, COMMON FEMORAL Right 12/17/2021    Surgeon: Kim    ESOPHAGOGASTRODUODENOSCOPY N/A 01/20/2023    Procedure: EGD/VCE placement;  Surgeon: Jose Luis Rodriguez MD;  Location: [facility] ENDOSCOPY;  Service: Gastroenterology;  Laterality: N/A;     Family History   Problem Relation Age of Onset    Heart disease Mother     Heart attack Mother     Heart failure Father      Social History     Socioeconomic History    Marital status: Single   Tobacco Use    Smoking status: Every Day     Current packs/day: 0.50     Types: Cigarettes    Smokeless tobacco: Never   Substance and Sexual Activity    Alcohol use: Not Currently    Drug use: Never     Current Outpatient Medications   Medication Instructions    amlodipine-benazepril 10-20mg (LOTREL) 10-20 mg per capsule 1 capsule, Oral, Daily    aspirin (ECOTRIN) 81  mg, Oral, Daily    atorvastatin (LIPITOR) 40 mg, Oral, Nightly    calcium carbonate-vitamin D3 500 mg-10 mcg (400 unit) Tab 1 tablet with a meal    cyclobenzaprine (FLEXERIL) 10 mg, Oral, As needed (PRN)    diclofenac sodium (VOLTAREN) 2 g, Topical (Top), 2 times daily    docusate sodium 100 mg capsule 1 tablet, Oral, Every other day    esomeprazole magnesium 20 mg TbEC 1 capsule, Oral, Nightly    ezetimibe (ZETIA) 10 mg, Oral, Daily    metoprolol tartrate (LOPRESSOR) 12.5 mg, Oral, 2 times daily    XARELTO 2.5 mg, Oral, 2 times daily     Review of patient's allergies indicates:   Allergen Reactions    Naproxen Swelling       Patient Care Team:  Ana María Herrera FNP as PCP - General (Family Medicine)        No follow-ups on file. In addition to their scheduled follow up, the patient has also been instructed to follow up on as needed basis.     Future Appointments   Date Time Provider Department Center   8/28/2023  2:00 PM Cammie Bingham NP [facility]   1/3/2024  9:15 AM Nita Hansen FNP [facility] CARD [facility]   8/13/2024 10:00 AM Magdaleno Alvarez MD [facility] MARTY [facility]

## 2023-08-10 ENCOUNTER — OFFICE VISIT (OUTPATIENT)
Dept: VASCULAR SURGERY | Facility: CLINIC | Age: 68
End: 2023-08-10
Payer: MEDICARE

## 2023-08-10 VITALS — SYSTOLIC BLOOD PRESSURE: 123 MMHG | DIASTOLIC BLOOD PRESSURE: 72 MMHG | HEART RATE: 55 BPM

## 2023-08-10 DIAGNOSIS — Z72.0 TOBACCO USE: ICD-10-CM

## 2023-08-10 DIAGNOSIS — I73.9 PAD (PERIPHERAL ARTERY DISEASE): Primary | ICD-10-CM

## 2023-08-10 PROCEDURE — 3074F PR MOST RECENT SYSTOLIC BLOOD PRESSURE < 130 MM HG: ICD-10-PCS | Mod: CPTII,,, | Performed by: SPECIALIST

## 2023-08-10 PROCEDURE — 99213 OFFICE O/P EST LOW 20 MIN: CPT | Mod: ,,, | Performed by: SPECIALIST

## 2023-08-10 PROCEDURE — 1160F PR REVIEW ALL MEDS BY PRESCRIBER/CLIN PHARMACIST DOCUMENTED: ICD-10-PCS | Mod: CPTII,,, | Performed by: SPECIALIST

## 2023-08-10 PROCEDURE — 1101F PT FALLS ASSESS-DOCD LE1/YR: CPT | Mod: CPTII,,, | Performed by: SPECIALIST

## 2023-08-10 PROCEDURE — 1160F RVW MEDS BY RX/DR IN RCRD: CPT | Mod: CPTII,,, | Performed by: SPECIALIST

## 2023-08-10 PROCEDURE — 1159F MED LIST DOCD IN RCRD: CPT | Mod: CPTII,,, | Performed by: SPECIALIST

## 2023-08-10 PROCEDURE — 3078F PR MOST RECENT DIASTOLIC BLOOD PRESSURE < 80 MM HG: ICD-10-PCS | Mod: CPTII,,, | Performed by: SPECIALIST

## 2023-08-10 PROCEDURE — 99213 PR OFFICE/OUTPT VISIT, EST, LEVL III, 20-29 MIN: ICD-10-PCS | Mod: ,,, | Performed by: SPECIALIST

## 2023-08-10 PROCEDURE — 1159F PR MEDICATION LIST DOCUMENTED IN MEDICAL RECORD: ICD-10-PCS | Mod: CPTII,,, | Performed by: SPECIALIST

## 2023-08-10 PROCEDURE — 1101F PR PT FALLS ASSESS DOC 0-1 FALLS W/OUT INJ PAST YR: ICD-10-PCS | Mod: CPTII,,, | Performed by: SPECIALIST

## 2023-08-10 PROCEDURE — 3288F PR FALLS RISK ASSESSMENT DOCUMENTED: ICD-10-PCS | Mod: CPTII,,, | Performed by: SPECIALIST

## 2023-08-10 PROCEDURE — 3074F SYST BP LT 130 MM HG: CPT | Mod: CPTII,,, | Performed by: SPECIALIST

## 2023-08-10 PROCEDURE — 3078F DIAST BP <80 MM HG: CPT | Mod: CPTII,,, | Performed by: SPECIALIST

## 2023-08-10 PROCEDURE — 4010F PR ACE/ARB THEARPY RXD/TAKEN: ICD-10-PCS | Mod: CPTII,,, | Performed by: SPECIALIST

## 2023-08-10 PROCEDURE — 3288F FALL RISK ASSESSMENT DOCD: CPT | Mod: CPTII,,, | Performed by: SPECIALIST

## 2023-08-10 PROCEDURE — 4010F ACE/ARB THERAPY RXD/TAKEN: CPT | Mod: CPTII,,, | Performed by: SPECIALIST

## 2023-08-11 DIAGNOSIS — R55 POSTURAL DIZZINESS WITH PRESYNCOPE: Primary | ICD-10-CM

## 2023-08-11 DIAGNOSIS — R42 POSTURAL DIZZINESS WITH PRESYNCOPE: Primary | ICD-10-CM

## 2023-08-21 ENCOUNTER — LAB VISIT (OUTPATIENT)
Dept: LAB | Facility: HOSPITAL | Age: 68
End: 2023-08-21
Attending: STUDENT IN AN ORGANIZED HEALTH CARE EDUCATION/TRAINING PROGRAM
Payer: MEDICARE

## 2023-08-21 DIAGNOSIS — D50.9 IRON DEFICIENCY ANEMIA: Primary | ICD-10-CM

## 2023-08-21 LAB
ALBUMIN SERPL-MCNC: 3.6 G/DL (ref 3.4–4.8)
ALBUMIN/GLOB SERPL: 0.9 RATIO (ref 1.1–2)
ALP SERPL-CCNC: 76 UNIT/L (ref 40–150)
ALT SERPL-CCNC: 15 UNIT/L (ref 0–55)
AST SERPL-CCNC: 24 UNIT/L (ref 5–34)
BASOPHILS # BLD AUTO: 0.09 X10(3)/MCL
BASOPHILS NFR BLD AUTO: 1.2 %
BILIRUB SERPL-MCNC: 0.6 MG/DL
BUN SERPL-MCNC: 16.8 MG/DL (ref 9.8–20.1)
CALCIUM SERPL-MCNC: 9.5 MG/DL (ref 8.4–10.2)
CHLORIDE SERPL-SCNC: 101 MMOL/L (ref 98–107)
CO2 SERPL-SCNC: 31 MMOL/L (ref 23–31)
CREAT SERPL-MCNC: 0.86 MG/DL (ref 0.55–1.02)
EOSINOPHIL # BLD AUTO: 0.5 X10(3)/MCL (ref 0–0.9)
EOSINOPHIL NFR BLD AUTO: 6.8 %
ERYTHROCYTE [DISTWIDTH] IN BLOOD BY AUTOMATED COUNT: 13.5 % (ref 11.5–17)
FERRITIN SERPL-MCNC: 21.3 NG/ML (ref 4.63–204)
GFR SERPLBLD CREATININE-BSD FMLA CKD-EPI: >60 MLS/MIN/1.73/M2
GLOBULIN SER-MCNC: 3.8 GM/DL (ref 2.4–3.5)
GLUCOSE SERPL-MCNC: 103 MG/DL (ref 82–115)
HCT VFR BLD AUTO: 36.7 % (ref 37–47)
HGB BLD-MCNC: 11.3 G/DL (ref 12–16)
IMM GRANULOCYTES # BLD AUTO: 0.01 X10(3)/MCL (ref 0–0.04)
IMM GRANULOCYTES NFR BLD AUTO: 0.1 %
IRON SATN MFR SERPL: 8 % (ref 20–50)
IRON SERPL-MCNC: 26 UG/DL (ref 50–170)
LYMPHOCYTES # BLD AUTO: 2.11 X10(3)/MCL (ref 0.6–4.6)
LYMPHOCYTES NFR BLD AUTO: 28.9 %
MCH RBC QN AUTO: 27.9 PG (ref 27–31)
MCHC RBC AUTO-ENTMCNC: 30.8 G/DL (ref 33–36)
MCV RBC AUTO: 90.6 FL (ref 80–94)
MONOCYTES # BLD AUTO: 0.77 X10(3)/MCL (ref 0.1–1.3)
MONOCYTES NFR BLD AUTO: 10.5 %
NEUTROPHILS # BLD AUTO: 3.82 X10(3)/MCL (ref 2.1–9.2)
NEUTROPHILS NFR BLD AUTO: 52.5 %
PLATELET # BLD AUTO: 350 X10(3)/MCL (ref 130–400)
PMV BLD AUTO: 10.2 FL (ref 7.4–10.4)
POTASSIUM SERPL-SCNC: 4.8 MMOL/L (ref 3.5–5.1)
PROT SERPL-MCNC: 7.4 GM/DL (ref 5.8–7.6)
RBC # BLD AUTO: 4.05 X10(6)/MCL (ref 4.2–5.4)
RET# (OHS): 0.06 (ref 0.02–0.08)
RETICULOCYTE COUNT AUTOMATED (OLG): 1.51 % (ref 1.1–2.1)
SODIUM SERPL-SCNC: 136 MMOL/L (ref 136–145)
TIBC SERPL-MCNC: 316 UG/DL (ref 70–310)
TIBC SERPL-MCNC: 342 UG/DL (ref 250–450)
TRANSFERRIN SERPL-MCNC: 321 MG/DL (ref 173–360)
WBC # SPEC AUTO: 7.3 X10(3)/MCL (ref 4.5–11.5)

## 2023-08-21 PROCEDURE — 85045 AUTOMATED RETICULOCYTE COUNT: CPT

## 2023-08-21 PROCEDURE — 85025 COMPLETE CBC W/AUTO DIFF WBC: CPT

## 2023-08-21 PROCEDURE — 80053 COMPREHEN METABOLIC PANEL: CPT

## 2023-08-21 PROCEDURE — 36415 COLL VENOUS BLD VENIPUNCTURE: CPT

## 2023-08-21 PROCEDURE — 83540 ASSAY OF IRON: CPT

## 2023-08-21 PROCEDURE — 82728 ASSAY OF FERRITIN: CPT

## 2023-08-25 NOTE — PROGRESS NOTES
HEMATOLOGY / ONCOLOGY   CLINIC NOTE     ONCOLOGICAL HISTORY:     Previous hematologist/oncologist: Dr. Orona    Diagnosis:  - Iron Deficiency Anemia     Treatment History:  - PRBC transfusion     Current Treatment:   -     Subjective:       Chief Complaint: Results          HPI      Crystal Lara  67 y.o.  female with past medical history significant for CAD, peripheral vascular disease on low-dose Xarelto, hypertension, osteoarthritis, iron-deficiency anemia and prior GI bleeding in 2021 from a duodenal angiectasia who was admitted for low H&H. She did have an EGD in November 2021 which noted duodenal angiectasia that was treated with APC.  Patient was recently gain admitted in January of 2023 with anemia and noted to have hemoglobin of 7.2 with guaiac being positive.  Patient had an EGD done.    Patient denies any abdominal pain, nausea, vomiting, diarrhea, blood in the bowel movements or any dark bowel movements.  Patient now feeling better and denies any symptoms at the moment.  Patient is supposed to follow up with GI for further workup    Interval History:     Today, 08/28/2023, patient denies any acute concerns today.  She had a f/u OV with [facility] but does not remember provider name. She has not repeated push enteroscopy to evaluate angiectasia. She denies any fevers, chills, drenching night sweats, decreased appetite, weight loss.  She denies any blood in his stools, dark tarry stools, easy bruising or bleeding.  She denies any chest pain, shortness of breath, fatigue, weakness, or headaches/dizziness.        Past Medical History:   Diagnosis Date    Coronary artery disease     Hyperlipidemia     Hypertension     PAD (peripheral artery disease)       Past Surgical History:   Procedure Laterality Date    CAPSULE ENDOSCOPY, ESOPHAGUS THROUGH ILEUM N/A 01/20/2023    Procedure: CAPSULE ENDOSCOPY, ESOPHAGUS THROUGH ILEUM;  Surgeon: Jose Luis Rodriguez MD;  Location: [facility] ENDOSCOPY;  Service:  Gastroenterology;  Laterality: N/A;    CARDIAC CATHETERIZATION      CORONARY ANGIOPLASTY      ENDARTERECTOMY, COMMON FEMORAL Right 12/17/2021    Surgeon: Kim    ESOPHAGOGASTRODUODENOSCOPY N/A 01/20/2023    Procedure: EGD/VCE placement;  Surgeon: Jose Luis Rodriguez MD;  Location: [facility] ENDOSCOPY;  Service: Gastroenterology;  Laterality: N/A;     Social History     Socioeconomic History    Marital status: Single   Tobacco Use    Smoking status: Every Day     Current packs/day: 0.50     Types: Cigarettes    Smokeless tobacco: Never   Substance and Sexual Activity    Alcohol use: Not Currently    Drug use: Never      Family History   Problem Relation Age of Onset    Heart disease Mother     Heart attack Mother     Heart failure Father       Review of patient's allergies indicates:   Allergen Reactions    Naproxen Swelling      Review of systems  CONSTITUTIONAL: no fevers, no chills, no weight loss, no fatigue, no weakness  HEMATOLOGIC: no abnormal bleeding, no abnormal bruising, no drenching night sweats  ONCOLOGIC: no new masses or lumps  HEENT: no vision loss, no tinnitus or hearing loss, no nose bleeding, no dysphagia, no odynophagia  CVS: no chest pain, no palpitations, no dyspnea on exertion  RESP: no shortness of breath, no hemoptysis, no cough  GI: no nausea, no vomiting, no diarrhea, no constipation, no melena, no hematochezia, no hematemesis, no abdominal pain, no increase in abdominal girth  : no dysuria, no hematuria, no hesitancy, no scrotal swelling, no discharge  INTEGUMENT: no rashes, no abnormal bruising, no nail pitting, no hyperpigmentation  NEURO: no falls, no memory loss, no paresthesias or dysesthesias, no urofecal incontinence or retention, no loss of strength on any extremity  MSK: no back pain, no new joint pain, no joint swelling  PSYCH: no suicidal or homicidal ideation, no depression, no insomnia, no anhedonia  ENDOCRINE: no heat or cold intolerance, no polyuria, no polydipsia         Objective:        Vitals:    08/28/23 1334   BP: 113/69   Pulse: (!) 56   Resp: 20   Temp: 97.8 °F (36.6 °C)          Physical Exam:  ECOG PS 0  GA: AAOx3, NAD  HEENT: NCAT, PERRLA, EOMI, good dentition, moist oral mucous membranes  LYMPH: no cervical, axillary or supraclavicular adenopathy  CVS: s1s2 RRR, no M/R/G  RESP: CTA b/l, no crackles, no wheezes or rhonchi  ABD: soft, NT, ND, BS+, no hepatosplenomegaly  EXT: no deformities, no pedal edema  SKIN: no rashes, warm and dry  NEURO: normal mentation, strength 5/5 on all 4 extremities, no sensory deficits        LABS / IMAGING        08/21/23: cr 0.86, ca 9.5, LFTs WNL, alb 3.6, wbc 7.30, hgb 11.3, plt 350, ANC 3.82, iron 26, iron sat 8%, ferritin 21.30  03/27/2023:  Creatinine 0.7, albumin 3.4, calcium 9.1, LFTs within normal limits, iron 40, TIBC 324,% saturation 12, ferritin 53, WBC count 8.6, hemoglobin 12.3, MCV 82.5, platelet count 265, normal differential.    03/09/2023:  Creatinine 0.79, albumin 3.6, LFTs within normal limits, iron 49, TIBC 313, % saturation 16, ferritin 54, folic acid 12.9, vitamin B12 343, CRP 0.2, WBC count 7.5, hemoglobin 12.0, MCV 80.7, platelet count 309.  Retic count 1.01.  Sedimentation rate 22.  ZENON not detected.  Copper 129.4.  Erythropoietin 10.        Assessment:     IRON DEFICIENCY ANEMIA:   - Admitted initially in November of 2021 with anemia now noted to have duodenal angiectasia status post APC.  Admitted again with anemia in December of 2022 and then in January of 2023 requiring blood transfusions.  Repeat labs on 02/28/2023 with normal hemoglobin of 13.3  - 01/20/2023 EGD:  Normal esophagus, stomach, duodenum    Plan:     Reviewed repeat labs hgb stable. Mild decrease in iron/ ferritin levels  Patient had f/u with GI in 3/2023.  Patient yet to to repeat push enteroscopy. She reports GI office never scheduled.    Reinforced patient contact gastroenterologist to schedule her procedure via patient portal. Patient needs  further evaluation of GI bleeding. Patient's daughter establishing patient account with [facility]astro clinic via portal during clinic visit.  Patient is agreeable to take oral iron daily w/ vit C to improved iron levels. She remains asymptomatic (denies fatigue, weakness, sob, dizziness).  Plan to follow-up in 3 months with repeat labs including iron panel.        A total of  30 minutes were spent in review of records, interpretation of test, coordination of care, discussion and counseling with the patient.

## 2023-08-28 ENCOUNTER — OFFICE VISIT (OUTPATIENT)
Dept: HEMATOLOGY/ONCOLOGY | Facility: CLINIC | Age: 68
End: 2023-08-28
Payer: MEDICARE

## 2023-08-28 VITALS
RESPIRATION RATE: 20 BRPM | HEART RATE: 56 BPM | BODY MASS INDEX: 23.88 KG/M2 | SYSTOLIC BLOOD PRESSURE: 113 MMHG | WEIGHT: 126.5 LBS | TEMPERATURE: 98 F | OXYGEN SATURATION: 100 % | DIASTOLIC BLOOD PRESSURE: 69 MMHG | HEIGHT: 61 IN

## 2023-08-28 DIAGNOSIS — D50.0 IRON DEFICIENCY ANEMIA DUE TO CHRONIC BLOOD LOSS: Primary | ICD-10-CM

## 2023-08-28 PROCEDURE — 3078F DIAST BP <80 MM HG: CPT | Mod: CPTII,,,

## 2023-08-28 PROCEDURE — 99214 OFFICE O/P EST MOD 30 MIN: CPT | Mod: ,,,

## 2023-08-28 PROCEDURE — 3078F PR MOST RECENT DIASTOLIC BLOOD PRESSURE < 80 MM HG: ICD-10-PCS | Mod: CPTII,,,

## 2023-08-28 PROCEDURE — 99214 PR OFFICE/OUTPT VISIT, EST, LEVL IV, 30-39 MIN: ICD-10-PCS | Mod: ,,,

## 2023-08-28 PROCEDURE — 4010F PR ACE/ARB THEARPY RXD/TAKEN: ICD-10-PCS | Mod: CPTII,,,

## 2023-08-28 PROCEDURE — 3288F PR FALLS RISK ASSESSMENT DOCUMENTED: ICD-10-PCS | Mod: CPTII,,,

## 2023-08-28 PROCEDURE — 3074F PR MOST RECENT SYSTOLIC BLOOD PRESSURE < 130 MM HG: ICD-10-PCS | Mod: CPTII,,,

## 2023-08-28 PROCEDURE — 1101F PT FALLS ASSESS-DOCD LE1/YR: CPT | Mod: CPTII,,,

## 2023-08-28 PROCEDURE — 1159F PR MEDICATION LIST DOCUMENTED IN MEDICAL RECORD: ICD-10-PCS | Mod: CPTII,,,

## 2023-08-28 PROCEDURE — 1101F PR PT FALLS ASSESS DOC 0-1 FALLS W/OUT INJ PAST YR: ICD-10-PCS | Mod: CPTII,,,

## 2023-08-28 PROCEDURE — 3008F PR BODY MASS INDEX (BMI) DOCUMENTED: ICD-10-PCS | Mod: CPTII,,,

## 2023-08-28 PROCEDURE — 3074F SYST BP LT 130 MM HG: CPT | Mod: CPTII,,,

## 2023-08-28 PROCEDURE — 1159F MED LIST DOCD IN RCRD: CPT | Mod: CPTII,,,

## 2023-08-28 PROCEDURE — 4010F ACE/ARB THERAPY RXD/TAKEN: CPT | Mod: CPTII,,,

## 2023-08-28 PROCEDURE — 3288F FALL RISK ASSESSMENT DOCD: CPT | Mod: CPTII,,,

## 2023-08-28 PROCEDURE — 3008F BODY MASS INDEX DOCD: CPT | Mod: CPTII,,,

## 2023-08-28 RX ORDER — FERROUS SULFATE 325(65) MG
325 TABLET ORAL DAILY
Qty: 30 TABLET | Refills: 3 | Status: SHIPPED | OUTPATIENT
Start: 2023-08-28 | End: 2024-08-27

## 2023-08-29 DIAGNOSIS — D50.0 IRON DEFICIENCY ANEMIA DUE TO CHRONIC BLOOD LOSS: Primary | ICD-10-CM

## 2023-09-11 ENCOUNTER — HOSPITAL ENCOUNTER (OUTPATIENT)
Dept: RADIOLOGY | Facility: HOSPITAL | Age: 68
Discharge: HOME | End: 2023-09-11
Attending: NURSE PRACTITIONER
Payer: MEDICARE

## 2023-09-11 DIAGNOSIS — R55 POSTURAL DIZZINESS WITH PRESYNCOPE: ICD-10-CM

## 2023-09-11 DIAGNOSIS — R42 POSTURAL DIZZINESS WITH PRESYNCOPE: ICD-10-CM

## 2023-09-11 PROCEDURE — 93880 EXTRACRANIAL BILAT STUDY: CPT | Mod: TC

## 2023-10-27 ENCOUNTER — TELEPHONE (OUTPATIENT)
Dept: CARDIOLOGY | Facility: CLINIC | Age: 68
End: 2023-10-27
Payer: MEDICARE

## 2023-10-27 DIAGNOSIS — I25.10 CORONARY ARTERY DISEASE, UNSPECIFIED VESSEL OR LESION TYPE, UNSPECIFIED WHETHER ANGINA PRESENT, UNSPECIFIED WHETHER NATIVE OR TRANSPLANTED HEART: ICD-10-CM

## 2023-10-27 DIAGNOSIS — I73.9 PAD (PERIPHERAL ARTERY DISEASE): ICD-10-CM

## 2023-10-27 RX ORDER — RIVAROXABAN 2.5 MG/1
1 TABLET, FILM COATED ORAL 2 TIMES DAILY
Qty: 180 TABLET | Refills: 3 | Status: SHIPPED | OUTPATIENT
Start: 2023-10-27 | End: 2024-10-26

## 2023-10-27 RX ORDER — RIVAROXABAN 2.5 MG/1
1 TABLET, FILM COATED ORAL 2 TIMES DAILY
Qty: 60 TABLET | Refills: 0 | Status: SHIPPED | OUTPATIENT
Start: 2023-10-27 | End: 2023-10-27 | Stop reason: SDUPTHER

## 2023-10-27 NOTE — TELEPHONE ENCOUNTER
Pt called Dr. Alvarez's office today, 10/27/23. He states it is ok for her to continue with Xarelto 2.5 mg twice daily.

## 2023-10-31 NOTE — LETTER
[facility]      Date: 10/31/2023     surgery clearance   Ms. Crystal Lara    : 1955      Dear Dr. Adams    Authorization to proceed:       Ms. Lara is cleared to proceed with SBE from a Vascular standpoint. There are no concerns with patient undergoing this procedure in regard to her lower extremity arterial disease.     If cardiac clearance is desired, please contact patient's cardiologist.        Periprocedural medication recommendations:    We do not prescribe her Xarelto.    If Ms. Lara has prior cardiac procedures, she will need separate clearance to hold medications from her cardiologist.        Sincerely,      Magdaleno Alvarez MD  [facility]  Board Certified Vascular Surgeon          [facility] ? [phone] ? [email or web address]

## 2023-11-22 ENCOUNTER — LAB VISIT (OUTPATIENT)
Dept: LAB | Facility: HOSPITAL | Age: 68
End: 2023-11-22
Payer: MEDICARE

## 2023-11-22 DIAGNOSIS — D50.0 IRON DEFICIENCY ANEMIA DUE TO CHRONIC BLOOD LOSS: ICD-10-CM

## 2023-11-22 LAB
ALBUMIN SERPL-MCNC: 3.5 G/DL (ref 3.4–4.8)
ALBUMIN/GLOB SERPL: 0.9 RATIO (ref 1.1–2)
ALP SERPL-CCNC: 79 UNIT/L (ref 40–150)
ALT SERPL-CCNC: 20 UNIT/L (ref 0–55)
AST SERPL-CCNC: 24 UNIT/L (ref 5–34)
BASOPHILS # BLD AUTO: 0.08 X10(3)/MCL
BASOPHILS NFR BLD AUTO: 1 %
BILIRUB SERPL-MCNC: 0.6 MG/DL
BUN SERPL-MCNC: 12.8 MG/DL (ref 9.8–20.1)
CALCIUM SERPL-MCNC: 9 MG/DL (ref 8.4–10.2)
CHLORIDE SERPL-SCNC: 97 MMOL/L (ref 98–107)
CO2 SERPL-SCNC: 28 MMOL/L (ref 23–31)
CREAT SERPL-MCNC: 0.78 MG/DL (ref 0.55–1.02)
EOSINOPHIL # BLD AUTO: 0.26 X10(3)/MCL (ref 0–0.9)
EOSINOPHIL NFR BLD AUTO: 3.3 %
ERYTHROCYTE [DISTWIDTH] IN BLOOD BY AUTOMATED COUNT: 16 % (ref 11.5–17)
FERRITIN SERPL-MCNC: 47.4 NG/ML (ref 4.63–204)
GFR SERPLBLD CREATININE-BSD FMLA CKD-EPI: >60 MLS/MIN/1.73/M2
GLOBULIN SER-MCNC: 3.8 GM/DL (ref 2.4–3.5)
GLUCOSE SERPL-MCNC: 90 MG/DL (ref 82–115)
HCT VFR BLD AUTO: 38 % (ref 37–47)
HGB BLD-MCNC: 11.7 G/DL (ref 12–16)
IMM GRANULOCYTES # BLD AUTO: 0.01 X10(3)/MCL (ref 0–0.04)
IMM GRANULOCYTES NFR BLD AUTO: 0.1 %
IRON SATN MFR SERPL: 17 % (ref 20–50)
IRON SERPL-MCNC: 52 UG/DL (ref 50–170)
LYMPHOCYTES # BLD AUTO: 2.48 X10(3)/MCL (ref 0.6–4.6)
LYMPHOCYTES NFR BLD AUTO: 31.4 %
MCH RBC QN AUTO: 28.5 PG (ref 27–31)
MCHC RBC AUTO-ENTMCNC: 30.8 G/DL (ref 33–36)
MCV RBC AUTO: 92.7 FL (ref 80–94)
MONOCYTES # BLD AUTO: 0.7 X10(3)/MCL (ref 0.1–1.3)
MONOCYTES NFR BLD AUTO: 8.8 %
NEUTROPHILS # BLD AUTO: 4.38 X10(3)/MCL (ref 2.1–9.2)
NEUTROPHILS NFR BLD AUTO: 55.4 %
PLATELET # BLD AUTO: 275 X10(3)/MCL (ref 130–400)
PMV BLD AUTO: 9.6 FL (ref 7.4–10.4)
POTASSIUM SERPL-SCNC: 4.8 MMOL/L (ref 3.5–5.1)
PROT SERPL-MCNC: 7.3 GM/DL (ref 5.8–7.6)
RBC # BLD AUTO: 4.1 X10(6)/MCL (ref 4.2–5.4)
RET# (OHS): 0.07 X10E6/UL (ref 0.02–0.08)
RETICULOCYTE COUNT AUTOMATED (OLG): 1.67 % (ref 1.1–2.1)
SODIUM SERPL-SCNC: 132 MMOL/L (ref 136–145)
TIBC SERPL-MCNC: 254 UG/DL (ref 70–310)
TIBC SERPL-MCNC: 306 UG/DL (ref 250–450)
TRANSFERRIN SERPL-MCNC: 286 MG/DL (ref 173–360)
WBC # SPEC AUTO: 7.91 X10(3)/MCL (ref 4.5–11.5)

## 2023-11-22 PROCEDURE — 83550 IRON BINDING TEST: CPT

## 2023-11-22 PROCEDURE — 80053 COMPREHEN METABOLIC PANEL: CPT

## 2023-11-22 PROCEDURE — 85025 COMPLETE CBC W/AUTO DIFF WBC: CPT

## 2023-11-22 PROCEDURE — 85045 AUTOMATED RETICULOCYTE COUNT: CPT

## 2023-11-22 PROCEDURE — 36415 COLL VENOUS BLD VENIPUNCTURE: CPT

## 2023-11-22 PROCEDURE — 82728 ASSAY OF FERRITIN: CPT

## 2023-12-01 NOTE — PROGRESS NOTES
HEMATOLOGY / ONCOLOGY   CLINIC NOTE     ONCOLOGICAL HISTORY:     Previous hematologist/oncologist: Dr. Orona    Diagnosis:  - Iron Deficiency Anemia     Treatment History:  - PRBC transfusion     Current Treatment:   - Observation    Subjective:       Chief Complaint: No chief complaint on file.    HPI      Crystal Lara  68 y.o.  female with past medical history significant for CAD, peripheral vascular disease on low-dose Xarelto, hypertension, osteoarthritis, iron-deficiency anemia and prior GI bleeding in 2021 from a duodenal angiectasia who was admitted for low H&H. She did have an EGD in November 2021 which noted duodenal angiectasia that was treated with APC.  Patient was recently gain admitted in January of 2023 with anemia and noted to have hemoglobin of 7.2 with guaiac being positive.  Patient had an EGD done.    Patient denies any abdominal pain, nausea, vomiting, diarrhea, blood in the bowel movements or any dark bowel movements.  Patient now feeling better and denies any symptoms at the moment.  Patient is supposed to follow up with GI for further workup    Interval History:     Today, 12/4/2023, patient denies any acute concerns. She denies any fevers, chills, drenching night sweats, decreased appetite, weight loss.  She denies any blood stools, dark tarry stools, easy bruising or bleeding.  She denies any chest pain, shortness of breath, fatigue, weakness, or headaches/dizziness.  Patient followed up with GI 3/23 (Angie) and they recommended a repeat upper single balloon enteroscopy in an attempt to cauterize 2 small bowel anglectasias. Patient states that she has been having a hard time getting an appointment scheduled but that she will keep trying.    Past Medical History:   Diagnosis Date    Coronary artery disease     Hyperlipidemia     Hypertension     PAD (peripheral artery disease)       Past Surgical History:   Procedure Laterality Date    CAPSULE ENDOSCOPY, ESOPHAGUS THROUGH ILEUM  N/A 01/20/2023    Procedure: CAPSULE ENDOSCOPY, ESOPHAGUS THROUGH ILEUM;  Surgeon: Jose Luis Rodriguez MD;  Location: [facility] ENDOSCOPY;  Service: Gastroenterology;  Laterality: N/A;    CARDIAC CATHETERIZATION      CORONARY ANGIOPLASTY      ENDARTERECTOMY, COMMON FEMORAL Right 12/17/2021    Surgeon: Kim    ESOPHAGOGASTRODUODENOSCOPY N/A 01/20/2023    Procedure: EGD/VCE placement;  Surgeon: Jose Luis Rodriguez MD;  Location: [facility] ENDOSCOPY;  Service: Gastroenterology;  Laterality: N/A;     Social History     Socioeconomic History    Marital status: Single   Tobacco Use    Smoking status: Every Day     Current packs/day: 0.50     Types: Cigarettes    Smokeless tobacco: Never   Substance and Sexual Activity    Alcohol use: Not Currently    Drug use: Never      Family History   Problem Relation Age of Onset    Heart disease Mother     Heart attack Mother     Heart failure Father       Review of patient's allergies indicates:   Allergen Reactions    Naproxen Swelling      Review of systems  CONSTITUTIONAL: no fevers, no chills, no weight loss, no fatigue, no weakness  HEMATOLOGIC: no abnormal bleeding, no abnormal bruising, no drenching night sweats  ONCOLOGIC: no new masses or lumps  HEENT: no vision loss, no tinnitus or hearing loss, no nose bleeding, no dysphagia, no odynophagia  CVS: no chest pain, no palpitations, no dyspnea on exertion  RESP: no shortness of breath, no hemoptysis, no cough  GI: no nausea, no vomiting, no diarrhea, no constipation, no melena, no hematochezia, no hematemesis, no abdominal pain, no increase in abdominal girth  : no dysuria, no hematuria, no hesitancy, no scrotal swelling, no discharge  INTEGUMENT: no rashes, no abnormal bruising, no nail pitting, no hyperpigmentation  NEURO: no falls, no memory loss, no paresthesias or dysesthesias, no urofecal incontinence or retention, no loss of strength on any extremity  MSK: no back pain, no new joint pain, no joint swelling  PSYCH: no  suicidal or homicidal ideation, no depression, no insomnia, no anhedonia  ENDOCRINE: no heat or cold intolerance, no polyuria, no polydipsia        Objective:        There were no vitals filed for this visit.         Physical Exam:  ECOG PS 0  GA: AAOx3, NAD  HEENT: NCAT, PERRLA, EOMI, good dentition, moist oral mucous membranes  LYMPH: no cervical, axillary or supraclavicular adenopathy  CVS: s1s2 RRR, no M/R/G  RESP: CTA b/l, no crackles, no wheezes or rhonchi  ABD: soft, NT, ND, BS+, no hepatosplenomegaly  EXT: no deformities, no pedal edema  SKIN: no rashes, warm and dry  NEURO: normal mentation, strength 5/5 on all 4 extremities, no sensory deficits        LABS / IMAGING    11/22/23: cr 0.78, ca 9, LFTs WNL, alb 3.6, wbc 7.91, hgb 11.7, plt 275, ANC 4.38, iron 52, iron sat 17%, ferritin 47.4    08/21/23: cr 0.86, ca 9.5, LFTs WNL, alb 3.6, wbc 7.30, hgb 11.3, plt 350, ANC 3.82, iron 26, iron sat 8%, ferritin 21.30    03/27/2023:  Creatinine 0.7, albumin 3.4, calcium 9.1, LFTs within normal limits, iron 40, TIBC 324,% saturation 12, ferritin 53, WBC count 8.6, hemoglobin 12.3, MCV 82.5, platelet count 265, normal differential.    03/09/2023:  Creatinine 0.79, albumin 3.6, LFTs within normal limits, iron 49, TIBC 313, % saturation 16, ferritin 54, folic acid 12.9, vitamin B12 343, CRP 0.2, WBC count 7.5, hemoglobin 12.0, MCV 80.7, platelet count 309.  Retic count 1.01.  Sedimentation rate 22.  ZENON not detected.  Copper 129.4.  Erythropoietin 10.        Assessment:     IRON DEFICIENCY ANEMIA:   - Admitted initially in November of 2021 with anemia now noted to have duodenal angiectasia status post APC.  Admitted again with anemia in December of 2022 and then in January of 2023 requiring blood transfusions.  Repeat labs on 02/28/2023 with normal hemoglobin of 13.3  - 01/20/2023 EGD:  Normal esophagus, stomach, duodenum    Plan:     Reviewed repeat labs, Improvement noted.  Patient had f/u with GI in 3/2023.  GI  (Angie) recommended a repeat upper single balloon enteroscopy in an attempt to cauterize 2 small bowel anglectasias.  Reinforced patient contact gastroenterologist to schedule her procedure.  Patient continues to tolerate oral iron daily w/ vit C to improve iron levels. She remains asymptomatic (denies fatigue, weakness, sob, dizziness).  Plan to follow-up in 3 months with repeat labs including iron panel.      A total of  30 minutes were spent in review of records, interpretation of test, coordination of care, discussion and counseling with the patient.      Stacey [email or web address]

## 2023-12-04 ENCOUNTER — OFFICE VISIT (OUTPATIENT)
Dept: HEMATOLOGY/ONCOLOGY | Facility: CLINIC | Age: 68
End: 2023-12-04
Payer: MEDICARE

## 2023-12-04 VITALS
WEIGHT: 129.5 LBS | TEMPERATURE: 98 F | OXYGEN SATURATION: 96 % | HEIGHT: 61 IN | HEART RATE: 63 BPM | SYSTOLIC BLOOD PRESSURE: 134 MMHG | RESPIRATION RATE: 20 BRPM | BODY MASS INDEX: 24.45 KG/M2 | DIASTOLIC BLOOD PRESSURE: 74 MMHG

## 2023-12-04 DIAGNOSIS — D50.0 IRON DEFICIENCY ANEMIA DUE TO CHRONIC BLOOD LOSS: Primary | ICD-10-CM

## 2023-12-04 PROCEDURE — 1160F PR REVIEW ALL MEDS BY PRESCRIBER/CLIN PHARMACIST DOCUMENTED: ICD-10-PCS | Mod: CPTII,,,

## 2023-12-04 PROCEDURE — 99214 PR OFFICE/OUTPT VISIT, EST, LEVL IV, 30-39 MIN: ICD-10-PCS | Mod: ,,,

## 2023-12-04 PROCEDURE — 3075F PR MOST RECENT SYSTOLIC BLOOD PRESS GE 130-139MM HG: ICD-10-PCS | Mod: CPTII,,,

## 2023-12-04 PROCEDURE — 99214 OFFICE O/P EST MOD 30 MIN: CPT | Mod: ,,,

## 2023-12-04 PROCEDURE — 1159F MED LIST DOCD IN RCRD: CPT | Mod: CPTII,,,

## 2023-12-04 PROCEDURE — 3075F SYST BP GE 130 - 139MM HG: CPT | Mod: CPTII,,,

## 2023-12-04 PROCEDURE — 1101F PR PT FALLS ASSESS DOC 0-1 FALLS W/OUT INJ PAST YR: ICD-10-PCS | Mod: CPTII,,,

## 2023-12-04 PROCEDURE — 3288F FALL RISK ASSESSMENT DOCD: CPT | Mod: CPTII,,,

## 2023-12-04 PROCEDURE — 3008F PR BODY MASS INDEX (BMI) DOCUMENTED: ICD-10-PCS | Mod: CPTII,,,

## 2023-12-04 PROCEDURE — 3288F PR FALLS RISK ASSESSMENT DOCUMENTED: ICD-10-PCS | Mod: CPTII,,,

## 2023-12-04 PROCEDURE — 4010F ACE/ARB THERAPY RXD/TAKEN: CPT | Mod: CPTII,,,

## 2023-12-04 PROCEDURE — 3008F BODY MASS INDEX DOCD: CPT | Mod: CPTII,,,

## 2023-12-04 PROCEDURE — 3078F DIAST BP <80 MM HG: CPT | Mod: CPTII,,,

## 2023-12-04 PROCEDURE — 1160F RVW MEDS BY RX/DR IN RCRD: CPT | Mod: CPTII,,,

## 2023-12-04 PROCEDURE — 1159F PR MEDICATION LIST DOCUMENTED IN MEDICAL RECORD: ICD-10-PCS | Mod: CPTII,,,

## 2023-12-04 PROCEDURE — 4010F PR ACE/ARB THEARPY RXD/TAKEN: ICD-10-PCS | Mod: CPTII,,,

## 2023-12-04 PROCEDURE — 1101F PT FALLS ASSESS-DOCD LE1/YR: CPT | Mod: CPTII,,,

## 2023-12-04 PROCEDURE — 3078F PR MOST RECENT DIASTOLIC BLOOD PRESSURE < 80 MM HG: ICD-10-PCS | Mod: CPTII,,,

## 2024-01-24 NOTE — PROGRESS NOTES
CHIEF COMPLAINT:   Chief Complaint   Patient presents with    Follow-up     Denies any cardiac problems                   Review of patient's allergies indicates:   Allergen Reactions    Naproxen Swelling                                          HPI:  Crystal Lara 68 y.o. female with a past medical history of nonobstructive CAD per [facility] 2016, PAD s/p common femoral endarterectomy (2021), carotid artery stenosis (<50 stenosis to BICA), HTN, HLD, hx GI Bleed and Tobacco Use who presents to cardiology clinic for routine follow up and ongoing care.  Latest Echocardiogram completed in 2021 revealed mildly reduced EF of 45%. Patient underwent a previous Left Heart Catheterization in 2016 that revealed non obstructive CAD (LAD-50% distal stenosis; Circumflex 30% proximal stenosis, RCA with 40% stenosis).  Medication management was recommended at that time with plans for possible FFR of LAD if patient developed any recurrent angina. (See operative report below).  Carotid ultrasound completed on 9.11.23 demonstrated Bilateral carotid stenosis of less than 50%.     Patient presents to clinic today denying any chest pain, shortness of breath, palpitations, orthopnea, PND, peripheral edema, claudication, or syncope. She continues to report occasional lightheadedness but denies any actual syncope.  However she attributes  this to her anemia history.  Activity wise, the patient states she is able to perform her regular activities including heavy housework without experiencing any chest pain or shortness of breath.  She endorses compliance with her current medications and is currently tolerating without issue.  She continues to smoke approximately 5-6 cigarettes a day and states she is not ready to quit at this time.  She continues to follow with Dr. Alvarez for PAD.       Background:  At office visit on 11.10.21, patient's right lower extremity DP/PT was nondopplerable or palpable. Dr. Samson was contacted and was going to perform  angiogram on 11.11.21. Patient arrived at [facility] 11.11.21 and was found to be anemic with hemoglobin of 6.8 and was transfused 2 units of PRBC. 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 Colonoscopy revealed small internal hemorrhoids and a few small diverticula in the sigmoid colon. Arterial ultrasound was performed which revealed severe decrease in arterial flow in the right lower extremity. Ultrasound was negative for DVT. 12.17.21 common femoral endarterectomy was performed. Echo revealed LVEF 45%.    Hospitalization with GI Bleed in Dec. 2022 and January 2023      CARDIAC TESTING  ECHO 12.17.21  There is mild hypokinesis/slightly aneurysmal basal inferior wall motion.  EF is stable with mildly depressed EF estimated at 45%   Normal right ventricular size with preserved RV function  Trileaflet with trace AI   Normal structure with trace tricuspid valvular regurgitation noted    [facility] 4.13.16  Procedure summary  LAD with 50% distal stenosis with moderate atherosclerotic plaque  Circumflex with 30% proximal stenosis  RCA with 40% stenosis distal mid and moderate eccentric atherosclerotic plaque   Normal LVEF 60%   Recommendations   Medical management   Aggressive risk factor management   Consider FFR of LAD if develops recurrent angina                                                                                                                                                                                    Patient Active Problem List   Diagnosis    PAD (peripheral artery disease)    Hyperlipidemia LDL goal <70    Hypertension    Smoking    Symptomatic anemia    Tobacco use    Iron deficiency anemia due to chronic blood loss     Past Surgical History:   Procedure Laterality Date    CAPSULE ENDOSCOPY, ESOPHAGUS THROUGH ILEUM N/A 01/20/2023    Procedure: CAPSULE ENDOSCOPY, ESOPHAGUS THROUGH ILEUM;  Surgeon: Jose Luis Rodriguez MD;  Location: [facility] ENDOSCOPY;  Service:  Gastroenterology;  Laterality: N/A;    CARDIAC CATHETERIZATION      CORONARY ANGIOPLASTY      ENDARTERECTOMY, COMMON FEMORAL Right 12/17/2021    Surgeon: Kim    ESOPHAGOGASTRODUODENOSCOPY N/A 01/20/2023    Procedure: EGD/VCE placement;  Surgeon: Jose Luis Rodriguez MD;  Location: [facility] ENDOSCOPY;  Service: Gastroenterology;  Laterality: N/A;     Social History     Socioeconomic History    Marital status: Single   Tobacco Use    Smoking status: Every Day     Current packs/day: 0.50     Types: Cigarettes    Smokeless tobacco: Never   Substance and Sexual Activity    Alcohol use: Not Currently    Drug use: Never        Family History   Problem Relation Age of Onset    Heart disease Mother     Heart attack Mother     Heart failure Father          Current Outpatient Medications:     amlodipine-benazepril 10-20mg (LOTREL) 10-20 mg per capsule, Take 1 capsule by mouth once daily., Disp: 90 capsule, Rfl: 3    aspirin (ECOTRIN) 81 MG EC tablet, Take 1 tablet (81 mg total) by mouth Daily., Disp: 90 tablet, Rfl: 3    atorvastatin (LIPITOR) 40 MG tablet, Take 1 tablet (40 mg total) by mouth nightly., Disp: 90 tablet, Rfl: 3    calcium carbonate-vitamin D3 500 mg-10 mcg (400 unit) Tab, 1 tablet with a meal, Disp: , Rfl:     cyclobenzaprine (FLEXERIL) 10 MG tablet, Take 10 mg by mouth as needed., Disp: , Rfl:     docusate sodium 100 mg capsule, Take 1 tablet by mouth every other day., Disp: , Rfl:     esomeprazole magnesium 20 mg TbEC, Take 1 capsule by mouth nightly., Disp: , Rfl:     ezetimibe (ZETIA) 10 mg tablet, Take 1 tablet (10 mg total) by mouth Daily., Disp: 90 tablet, Rfl: 3    ferrous sulfate (FEOSOL) 325 mg (65 mg iron) Tab tablet, Take 1 tablet (325 mg total) by mouth once daily., Disp: 30 tablet, Rfl: 3    metoprolol tartrate (LOPRESSOR) 25 MG tablet, Take 0.5 tablets (12.5 mg total) by mouth 2 (two) times a day., Disp: 90 tablet, Rfl: 3    rivaroxaban (XARELTO) 2.5 mg Tab, Take 1 tablet (2.5 mg total) by mouth 2  "(two) times a day., Disp: 180 tablet, Rfl: 3    diclofenac sodium (VOLTAREN) 1 % Gel, Apply 2 g topically 2 (two) times daily. (Patient not taking: Reported on 1/29/2024), Disp: 50 g, Rfl: 1     ROS:                                                                                                                                                                             Review of Systems   Constitutional: Negative.    Respiratory: Negative.     Cardiovascular: Negative.    Gastrointestinal: Negative.    Musculoskeletal: Negative.    Skin: Negative.    Neurological: Negative.    Psychiatric/Behavioral: Negative.          Blood pressure 108/62, pulse 60, temperature 97.8 °F (36.6 °C), temperature source Oral, resp. rate 20, height 5' 1" (1.549 m), weight 58.9 kg (129 lb 13.6 oz), SpO2 100 %.   PE:  Physical Exam  Constitutional:       Appearance: Normal appearance.   HENT:      Head: Normocephalic and atraumatic.   Eyes:      Extraocular Movements: Extraocular movements intact.      Pupils: Pupils are equal, round, and reactive to light.   Cardiovascular:      Rate and Rhythm: Normal rate and regular rhythm.   Pulmonary:      Effort: Pulmonary effort is normal.      Breath sounds: Normal breath sounds.   Abdominal:      Palpations: Abdomen is soft.   Musculoskeletal:         General: Normal range of motion.      Cervical back: Normal range of motion.   Skin:     General: Skin is warm and dry.   Neurological:      General: No focal deficit present.      Mental Status: She is alert and oriented to person, place, and time.   Psychiatric:         Mood and Affect: Mood normal.         Behavior: Behavior normal.          ASSESSMENT/PLAN:  Nonobstructive CAD per [facility] 2016  - [facility] - Nonobstructive CAD-LAD 50% stenosis, left circumflex 30% proximal stenosis, RCA 40% stenosis.  Recommended medical management at the time with consideration for FFR of LAD if patient developed recurrent angina. (4.13.16)  - EF -45% per ECHO " 12.17.21  - Denies any CP or SOB  - Continue Aspirin, Atorvastatin, Lotrel, Metoprolol Tartrate 12.5 mg BID and SL nitro PRN CP  - Counseled on heart healthy diet, exercise as tolerated, and smoking cessation  - EKG today     Mildly reduced EF  - EF-45% per ECHO 12.17.21  - Denies SOB or MEJÍA   - Euvolemic upon exam, warm and dry   - Continue Lotrel(amlodipine/benzapril 10-20 mg and metoprolol tartrate.  Patient has been on this medication for some time.  If repeat Echo shows any further reduction in EF, will plan to initiate GDMT, switch tartrate to succinate  - Obtain up to date ECHO to reassess LV function and check for any significant valvular abnormalities     PAD s/p common femoral endarterectomy (2021)  - Arterial ultrasound -severe decrease in arterial flow in the right lower extremity. Ultrasound was negative for DVT. (11.11.21)   - Denies Claudication symptoms   - Continue ASA. Defer recommendations on continuation of Xarelto to Dr. Alvarez who patient currently follows for the PAD  - Follow up with Dr. Alvarez as directed     Hypertension  - BP at goal   - Continue Metoprolol Tartrate 12.5 mg BID and Lotrel 10-20 mg daily   - Counseled on low-salt diet and exercise as tolerated    HLD  - LDL at goal -65  - Continue Lipitor 40 mg and Zetia 10mg. Reports intolerance to lipitor 80mg in the past  - Advised on heart healthy, low-fat low-cholesterol diet and exercise as tolerated    Carotid Artery Stenosis   - Denies any further symptoms  - Carotid US- less than 50% stenosis to bilateral internal carotid arteries (Sept. 2023)    Tobacco Use  - Patient currently smokes 5-6 cigarettes per day and is not ready to quit at this time  - Counseled on the importance of smoking cessation    Hx of GI bleed in Dec. 2021, Dec. 2022 and Jan. 2023  - Continue to Follow with GI clinic as directed     Anemia- stable   - Continue management per hematology       EKG  ECHO  Follow up in Cardiology Clinic in 4 months or sooner if  needed   Follow-up with PCP, GI, hematology and Dr. Alvarez as directed

## 2024-01-29 ENCOUNTER — OFFICE VISIT (OUTPATIENT)
Dept: CARDIOLOGY | Facility: CLINIC | Age: 69
End: 2024-01-29
Payer: MEDICARE

## 2024-01-29 VITALS
OXYGEN SATURATION: 100 % | HEIGHT: 61 IN | TEMPERATURE: 98 F | DIASTOLIC BLOOD PRESSURE: 62 MMHG | RESPIRATION RATE: 20 BRPM | HEART RATE: 60 BPM | WEIGHT: 129.88 LBS | SYSTOLIC BLOOD PRESSURE: 108 MMHG | BODY MASS INDEX: 24.52 KG/M2

## 2024-01-29 DIAGNOSIS — I50.20 HFREF (HEART FAILURE WITH REDUCED EJECTION FRACTION): ICD-10-CM

## 2024-01-29 DIAGNOSIS — I25.10 CORONARY ARTERY DISEASE INVOLVING NATIVE CORONARY ARTERY OF NATIVE HEART WITHOUT ANGINA PECTORIS: Primary | ICD-10-CM

## 2024-01-29 DIAGNOSIS — I10 PRIMARY HYPERTENSION: ICD-10-CM

## 2024-01-29 DIAGNOSIS — E78.5 HYPERLIPIDEMIA LDL GOAL <70: ICD-10-CM

## 2024-01-29 DIAGNOSIS — I73.9 PAD (PERIPHERAL ARTERY DISEASE): ICD-10-CM

## 2024-01-29 DIAGNOSIS — Z72.0 TOBACCO USE: ICD-10-CM

## 2024-01-29 PROCEDURE — 3008F BODY MASS INDEX DOCD: CPT | Mod: CPTII,,, | Performed by: NURSE PRACTITIONER

## 2024-01-29 PROCEDURE — 99215 OFFICE O/P EST HI 40 MIN: CPT | Mod: PBBFAC | Performed by: NURSE PRACTITIONER

## 2024-01-29 PROCEDURE — 1101F PT FALLS ASSESS-DOCD LE1/YR: CPT | Mod: CPTII,,, | Performed by: NURSE PRACTITIONER

## 2024-01-29 PROCEDURE — 1159F MED LIST DOCD IN RCRD: CPT | Mod: CPTII,,, | Performed by: NURSE PRACTITIONER

## 2024-01-29 PROCEDURE — 3074F SYST BP LT 130 MM HG: CPT | Mod: CPTII,,, | Performed by: NURSE PRACTITIONER

## 2024-01-29 PROCEDURE — 99214 OFFICE O/P EST MOD 30 MIN: CPT | Mod: S$PBB,,, | Performed by: NURSE PRACTITIONER

## 2024-01-29 PROCEDURE — 3288F FALL RISK ASSESSMENT DOCD: CPT | Mod: CPTII,,, | Performed by: NURSE PRACTITIONER

## 2024-01-29 PROCEDURE — 1160F RVW MEDS BY RX/DR IN RCRD: CPT | Mod: CPTII,,, | Performed by: NURSE PRACTITIONER

## 2024-01-29 PROCEDURE — 93005 ELECTROCARDIOGRAM TRACING: CPT

## 2024-01-29 PROCEDURE — 3078F DIAST BP <80 MM HG: CPT | Mod: CPTII,,, | Performed by: NURSE PRACTITIONER

## 2024-01-29 RX ORDER — NITROGLYCERIN 0.4 MG/1
0.4 TABLET SUBLINGUAL EVERY 5 MIN PRN
Qty: 25 TABLET | Refills: 3 | Status: SHIPPED | OUTPATIENT
Start: 2024-01-29 | End: 2025-01-28

## 2024-01-29 NOTE — PATIENT INSTRUCTIONS
EKG  ECHO  Follow up in Cardiology Clinic in 4 months or sooner if needed   Follow-up with PCP, GI, hematology and Dr. Alvarez as directed

## 2024-02-26 ENCOUNTER — HOSPITAL ENCOUNTER (OUTPATIENT)
Dept: CARDIOLOGY | Facility: HOSPITAL | Age: 69
Discharge: HOME | End: 2024-02-26
Attending: NURSE PRACTITIONER
Payer: MEDICARE

## 2024-02-26 DIAGNOSIS — I50.20 HFREF (HEART FAILURE WITH REDUCED EJECTION FRACTION): ICD-10-CM

## 2024-02-29 NOTE — PROGRESS NOTES
HEMATOLOGY / ONCOLOGY   CLINIC NOTE     ONCOLOGICAL HISTORY:     Previous hematologist/oncologist: Dr. Orona    Diagnosis:  - Iron Deficiency Anemia     Treatment History:  - PRBC transfusion     Current Treatment:   - Observation    Subjective:       Chief Complaint: No chief complaint on file.    HPI      Crystal Lara  68 y.o.  female with past medical history significant for CAD, peripheral vascular disease on low-dose Xarelto, hypertension, osteoarthritis, iron-deficiency anemia and prior GI bleeding in 2021 from a duodenal angiectasia who was admitted for low H&H. She did have an EGD in November 2021 which noted duodenal angiectasia that was treated with APC.  Patient was recently gain admitted in January of 2023 with anemia and noted to have hemoglobin of 7.2 with guaiac being positive.  Patient had an EGD done.    Patient denies any abdominal pain, nausea, vomiting, diarrhea, blood in the bowel movements or any dark bowel movements.  Patient now feeling better and denies any symptoms at the moment.  Patient is supposed to follow up with GI for further workup    Interval History:     Today, 3/4/24, patient denies any acute concerns. She denies any fevers, chills, drenching night sweats, decreased appetite, weight loss.  She denies any blood stools, dark tarry stools, easy bruising or bleeding.  She denies any chest pain, shortness of breath, fatigue, weakness, or headaches/dizziness.  Patient followed up with GI 3/23 ([facility]) and they recommended a repeat upper single balloon enteroscopy in an attempt to cauterize 2 small bowel anglectasias. Patient would like a referral to a new GI due to inability to get procedure scheduled with [facility]. Sent referral to [facility] today.    Past Medical History:   Diagnosis Date    Coronary artery disease     Hyperlipidemia     Hypertension     PAD (peripheral artery disease)       Past Surgical History:   Procedure Laterality Date    CAPSULE ENDOSCOPY,  ESOPHAGUS THROUGH ILEUM N/A 01/20/2023    Procedure: CAPSULE ENDOSCOPY, ESOPHAGUS THROUGH ILEUM;  Surgeon: Jose Luis Rodriguez MD;  Location: [facility] ENDOSCOPY;  Service: Gastroenterology;  Laterality: N/A;    CARDIAC CATHETERIZATION      CORONARY ANGIOPLASTY      ENDARTERECTOMY, COMMON FEMORAL Right 12/17/2021    Surgeon: Kim    ESOPHAGOGASTRODUODENOSCOPY N/A 01/20/2023    Procedure: EGD/VCE placement;  Surgeon: Jose Luis Rodriguez MD;  Location: [facility] ENDOSCOPY;  Service: Gastroenterology;  Laterality: N/A;     Social History     Socioeconomic History    Marital status: Single   Tobacco Use    Smoking status: Every Day     Current packs/day: 0.50     Types: Cigarettes    Smokeless tobacco: Never   Substance and Sexual Activity    Alcohol use: Not Currently    Drug use: Never      Family History   Problem Relation Age of Onset    Heart disease Mother     Heart attack Mother     Heart failure Father       Review of patient's allergies indicates:   Allergen Reactions    Naproxen Swelling      Review of systems  CONSTITUTIONAL: no fevers, no chills, no weight loss, no fatigue, no weakness  HEMATOLOGIC: no abnormal bleeding, no abnormal bruising, no drenching night sweats  ONCOLOGIC: no new masses or lumps  HEENT: no vision loss, no tinnitus or hearing loss, no nose bleeding, no dysphagia, no odynophagia  CVS: no chest pain, no palpitations, no dyspnea on exertion  RESP: no shortness of breath, no hemoptysis, no cough  GI: no nausea, no vomiting, no diarrhea, no constipation, no melena, no hematochezia, no hematemesis, no abdominal pain, no increase in abdominal girth  : no dysuria, no hematuria, no hesitancy, no scrotal swelling, no discharge  INTEGUMENT: no rashes, no abnormal bruising, no nail pitting, no hyperpigmentation  NEURO: no falls, no memory loss, no paresthesias or dysesthesias, no urofecal incontinence or retention, no loss of strength on any extremity  MSK: no back pain, no new joint pain, no joint  swelling  PSYCH: no suicidal or homicidal ideation, no depression, no insomnia, no anhedonia  ENDOCRINE: no heat or cold intolerance, no polyuria, no polydipsia        Objective:        There were no vitals filed for this visit.         Physical Exam:  ECOG PS 0  GA: AAOx3, NAD  HEENT: NCAT, PERRLA, EOMI, good dentition, moist oral mucous membranes  LYMPH: no cervical, axillary or supraclavicular adenopathy  CVS: s1s2 RRR, no M/R/G  RESP: CTA b/l, no crackles, no wheezes or rhonchi  ABD: soft, NT, ND, BS+, no hepatosplenomegaly  EXT: no deformities, no pedal edema  SKIN: no rashes, warm and dry  NEURO: normal mentation, strength 5/5 on all 4 extremities, no sensory deficits        LABS / IMAGING    2/26/24 WBC 7.94, Hgb 11.9, MCV 95.7, Plt 298, ANC 4.39, Iron 37, TIBC 293, transferrin 279, Ferritin, 41.3, Iron sat 13, na 135, CMP otherwise WNL  11/22/23: cr 0.78, ca 9, LFTs WNL, alb 3.6, wbc 7.91, hgb 11.7, plt 275, ANC 4.38, iron 52, iron sat 17%, ferritin 47.4    08/21/23: cr 0.86, ca 9.5, LFTs WNL, alb 3.6, wbc 7.30, hgb 11.3, plt 350, ANC 3.82, iron 26, iron sat 8%, ferritin 21.30    03/27/2023:  Creatinine 0.7, albumin 3.4, calcium 9.1, LFTs within normal limits, iron 40, TIBC 324,% saturation 12, ferritin 53, WBC count 8.6, hemoglobin 12.3, MCV 82.5, platelet count 265, normal differential.    03/09/2023:  Creatinine 0.79, albumin 3.6, LFTs within normal limits, iron 49, TIBC 313, % saturation 16, ferritin 54, folic acid 12.9, vitamin B12 343, CRP 0.2, WBC count 7.5, hemoglobin 12.0, MCV 80.7, platelet count 309.  Retic count 1.01.  Sedimentation rate 22.  ZENON not detected.  Copper 129.4.  Erythropoietin 10.        Assessment:     IRON DEFICIENCY ANEMIA:   - Admitted initially in November of 2021 with anemia now noted to have duodenal angiectasia status post APC.  Admitted again with anemia in December of 2022 and then in January of 2023 requiring blood transfusions.  Repeat labs on 02/28/2023 with normal  hemoglobin of 13.3  - 01/20/2023 EGD:  Normal esophagus, stomach, duodenum    Plan:   2/4/24  Referred patient to Dr. Joiner per patient preference today.  Patient continues to tolerate oral iron daily w/ vit C to improve iron levels. She remains asymptomatic (denies fatigue, weakness, sob, dizziness).  Plan to follow-up in 3 months with repeat labs including iron panel.    Stacey MILLERP-C

## 2024-03-04 ENCOUNTER — OFFICE VISIT (OUTPATIENT)
Dept: HEMATOLOGY/ONCOLOGY | Facility: CLINIC | Age: 69
End: 2024-03-04
Payer: MEDICARE

## 2024-03-04 VITALS
WEIGHT: 130.69 LBS | OXYGEN SATURATION: 97 % | SYSTOLIC BLOOD PRESSURE: 132 MMHG | DIASTOLIC BLOOD PRESSURE: 71 MMHG | TEMPERATURE: 98 F | HEIGHT: 61 IN | HEART RATE: 52 BPM | RESPIRATION RATE: 18 BRPM | BODY MASS INDEX: 24.67 KG/M2

## 2024-03-04 DIAGNOSIS — D50.0 IRON DEFICIENCY ANEMIA DUE TO CHRONIC BLOOD LOSS: Primary | ICD-10-CM

## 2024-03-04 PROCEDURE — 1159F MED LIST DOCD IN RCRD: CPT | Mod: CPTII,,,

## 2024-03-04 PROCEDURE — 3008F BODY MASS INDEX DOCD: CPT | Mod: CPTII,,,

## 2024-03-04 PROCEDURE — 1101F PT FALLS ASSESS-DOCD LE1/YR: CPT | Mod: CPTII,,,

## 2024-03-04 PROCEDURE — 99214 OFFICE O/P EST MOD 30 MIN: CPT | Mod: ,,,

## 2024-03-04 PROCEDURE — 3078F DIAST BP <80 MM HG: CPT | Mod: CPTII,,,

## 2024-03-04 PROCEDURE — 3075F SYST BP GE 130 - 139MM HG: CPT | Mod: CPTII,,,

## 2024-03-04 PROCEDURE — 1160F RVW MEDS BY RX/DR IN RCRD: CPT | Mod: CPTII,,,

## 2024-03-04 PROCEDURE — 4010F ACE/ARB THERAPY RXD/TAKEN: CPT | Mod: CPTII,,,

## 2024-03-04 PROCEDURE — 3288F FALL RISK ASSESSMENT DOCD: CPT | Mod: CPTII,,,

## 2024-03-08 ENCOUNTER — HOSPITAL ENCOUNTER (OUTPATIENT)
Dept: CARDIOLOGY | Facility: HOSPITAL | Age: 69
Discharge: HOME | End: 2024-03-08
Attending: NURSE PRACTITIONER
Payer: MEDICARE

## 2024-03-08 LAB
CV ECHO LV RWT: 0.42 CM
ECHO LV POSTERIOR WALL: 1 CM (ref 0.6–1.1)
EJECTION FRACTION: 60 %
FRACTIONAL SHORTENING: 31 % (ref 28–44)
INTERVENTRICULAR SEPTUM: 1 CM (ref 0.6–1.1)
LEFT ATRIUM SIZE: 3.3 CM
LEFT INTERNAL DIMENSION IN SYSTOLE: 3.3 CM (ref 2.1–4)
LEFT VENTRICULAR INTERNAL DIMENSION IN DIASTOLE: 4.8 CM (ref 3.5–6)
LEFT VENTRICULAR MASS: 170.19 G

## 2024-03-08 PROCEDURE — 93306 TTE W/DOPPLER COMPLETE: CPT

## 2024-06-17 ENCOUNTER — LAB VISIT (OUTPATIENT)
Dept: LAB | Facility: HOSPITAL | Age: 69
End: 2024-06-17
Payer: MEDICARE

## 2024-06-17 DIAGNOSIS — D50.9 IDA (IRON DEFICIENCY ANEMIA): Primary | ICD-10-CM

## 2024-06-17 LAB
ALBUMIN SERPL-MCNC: 3.5 G/DL (ref 3.4–4.8)
ALBUMIN/GLOB SERPL: 1.1 RATIO (ref 1.1–2)
ALP SERPL-CCNC: 61 UNIT/L (ref 40–150)
ALT SERPL-CCNC: 18 UNIT/L (ref 0–55)
ANION GAP SERPL CALC-SCNC: 4 MEQ/L
AST SERPL-CCNC: 21 UNIT/L (ref 5–34)
BASOPHILS # BLD AUTO: 0.07 X10(3)/MCL
BASOPHILS NFR BLD AUTO: 0.9 %
BILIRUB SERPL-MCNC: 0.6 MG/DL
BUN SERPL-MCNC: 13.9 MG/DL (ref 9.8–20.1)
CALCIUM SERPL-MCNC: 9.6 MG/DL (ref 8.4–10.2)
CHLORIDE SERPL-SCNC: 99 MMOL/L (ref 98–107)
CO2 SERPL-SCNC: 30 MMOL/L (ref 23–31)
CREAT SERPL-MCNC: 0.99 MG/DL (ref 0.55–1.02)
CREAT/UREA NIT SERPL: 14
EOSINOPHIL # BLD AUTO: 0.33 X10(3)/MCL (ref 0–0.9)
EOSINOPHIL NFR BLD AUTO: 4.4 %
ERYTHROCYTE [DISTWIDTH] IN BLOOD BY AUTOMATED COUNT: 13.3 % (ref 11.5–17)
FOLATE SERPL-MCNC: 11.2 NG/ML (ref 7–31.4)
GFR SERPLBLD CREATININE-BSD FMLA CKD-EPI: >60 ML/MIN/1.73/M2
GLOBULIN SER-MCNC: 3.3 GM/DL (ref 2.4–3.5)
GLUCOSE SERPL-MCNC: 128 MG/DL (ref 82–115)
HCT VFR BLD AUTO: 40.3 % (ref 37–47)
HGB BLD-MCNC: 13.2 G/DL (ref 12–16)
IMM GRANULOCYTES # BLD AUTO: 0.02 X10(3)/MCL (ref 0–0.04)
IMM GRANULOCYTES NFR BLD AUTO: 0.3 %
IRON SERPL-MCNC: 65 UG/DL (ref 50–170)
LYMPHOCYTES # BLD AUTO: 1.94 X10(3)/MCL (ref 0.6–4.6)
LYMPHOCYTES NFR BLD AUTO: 25.8 %
MCH RBC QN AUTO: 31.1 PG (ref 27–31)
MCHC RBC AUTO-ENTMCNC: 32.8 G/DL (ref 33–36)
MCV RBC AUTO: 94.8 FL (ref 80–94)
MONOCYTES # BLD AUTO: 0.71 X10(3)/MCL (ref 0.1–1.3)
MONOCYTES NFR BLD AUTO: 9.4 %
NEUTROPHILS # BLD AUTO: 4.45 X10(3)/MCL (ref 2.1–9.2)
NEUTROPHILS NFR BLD AUTO: 59.2 %
PLATELET # BLD AUTO: 260 X10(3)/MCL (ref 130–400)
PMV BLD AUTO: 9.5 FL (ref 7.4–10.4)
POTASSIUM SERPL-SCNC: 4.5 MMOL/L (ref 3.5–5.1)
PROT SERPL-MCNC: 6.8 GM/DL (ref 5.8–7.6)
RBC # BLD AUTO: 4.25 X10(6)/MCL (ref 4.2–5.4)
SODIUM SERPL-SCNC: 133 MMOL/L (ref 136–145)
WBC # BLD AUTO: 7.52 X10(3)/MCL (ref 4.5–11.5)

## 2024-06-17 PROCEDURE — 80053 COMPREHEN METABOLIC PANEL: CPT

## 2024-06-17 PROCEDURE — 85025 COMPLETE CBC W/AUTO DIFF WBC: CPT

## 2024-06-17 PROCEDURE — 83540 ASSAY OF IRON: CPT

## 2024-06-17 PROCEDURE — 82746 ASSAY OF FOLIC ACID SERUM: CPT

## 2024-06-17 PROCEDURE — 36415 COLL VENOUS BLD VENIPUNCTURE: CPT

## 2024-06-24 NOTE — PROGRESS NOTES
HEMATOLOGY / ONCOLOGY   CLINIC NOTE     ONCOLOGICAL HISTORY:     Previous hematologist/oncologist: Dr. Orona    Diagnosis:  - Iron Deficiency Anemia     Treatment History:  - PRBC transfusion     Current Treatment:   - Observation    Subjective:       Chief Complaint: Results    HPI      Crystal Lara  68 y.o.  female with past medical history significant for CAD, peripheral vascular disease on low-dose Xarelto, hypertension, osteoarthritis, iron-deficiency anemia and prior GI bleeding in 2021 from a duodenal angiectasia who was admitted for low H&H. She did have an EGD in November 2021 which noted duodenal angiectasia that was treated with APC.  Patient was recently gain admitted in January of 2023 with anemia and noted to have hemoglobin of 7.2 with guaiac being positive.  Patient had an EGD done.    Patient denies any abdominal pain, nausea, vomiting, diarrhea, blood in the bowel movements or any dark bowel movements.  Patient now feeling better and denies any symptoms at the moment.  Patient is supposed to follow up with GI for further workup    Interval History:     Today, 6/25/24, patient denies any acute concerns. She denies any fevers, chills, drenching night sweats, decreased appetite, weight loss.  She denies any blood stools, dark tarry stools, easy bruising or bleeding.  She denies any chest pain, shortness of breath, fatigue, weakness, or headaches/dizziness.  Patient has not yet followed up with [facility]. Labwork WNL today. Patient has been compliant with oral iron.    Past Medical History:   Diagnosis Date    Coronary artery disease     Hyperlipidemia     Hypertension     PAD (peripheral artery disease)       Past Surgical History:   Procedure Laterality Date    CAPSULE ENDOSCOPY, ESOPHAGUS THROUGH ILEUM N/A 01/20/2023    Procedure: CAPSULE ENDOSCOPY, ESOPHAGUS THROUGH ILEUM;  Surgeon: Jose Luis Rodriguez MD;  Location: [facility] ENDOSCOPY;  Service: Gastroenterology;  Laterality: N/A;    CARDIAC  CATHETERIZATION      CORONARY ANGIOPLASTY      ENDARTERECTOMY, COMMON FEMORAL Right 12/17/2021    Surgeon: Kim    ESOPHAGOGASTRODUODENOSCOPY N/A 01/20/2023    Procedure: EGD/VCE placement;  Surgeon: Jose Luis Rodriguez MD;  Location: [facility] ENDOSCOPY;  Service: Gastroenterology;  Laterality: N/A;     Social History     Socioeconomic History    Marital status: Single   Tobacco Use    Smoking status: Every Day     Current packs/day: 0.50     Types: Cigarettes    Smokeless tobacco: Never   Substance and Sexual Activity    Alcohol use: Not Currently    Drug use: Never      Family History   Problem Relation Name Age of Onset    Heart disease Mother      Heart attack Mother      Heart failure Father        Review of patient's allergies indicates:   Allergen Reactions    Naproxen Swelling      Review of systems  CONSTITUTIONAL: no fevers, no chills, no weight loss, no fatigue, no weakness  HEMATOLOGIC: no abnormal bleeding, no abnormal bruising, no drenching night sweats  ONCOLOGIC: no new masses or lumps  HEENT: no vision loss, no tinnitus or hearing loss, no nose bleeding, no dysphagia, no odynophagia  CVS: no chest pain, no palpitations, no dyspnea on exertion  RESP: no shortness of breath, no hemoptysis, no cough  GI: no nausea, no vomiting, no diarrhea, no constipation, no melena, no hematochezia, no hematemesis, no abdominal pain, no increase in abdominal girth  : no dysuria, no hematuria, no hesitancy, no scrotal swelling, no discharge  INTEGUMENT: no rashes, no abnormal bruising, no nail pitting, no hyperpigmentation  NEURO: no falls, no memory loss, no paresthesias or dysesthesias, no urofecal incontinence or retention, no loss of strength on any extremity  MSK: no back pain, no new joint pain, no joint swelling  PSYCH: no suicidal or homicidal ideation, no depression, no insomnia, no anhedonia  ENDOCRINE: no heat or cold intolerance, no polyuria, no polydipsia        Objective:        Vitals:    06/25/24 1048    BP: 129/82   Pulse: (!) 56   Resp: 18   Temp: 97.9 °F (36.6 °C)            Physical Exam:  ECOG PS 0  GA: AAOx3, NAD  HEENT: NCAT, PERRLA, EOMI, good dentition, moist oral mucous membranes  LYMPH: no cervical, axillary or supraclavicular adenopathy  CVS: s1s2 RRR, no M/R/G  RESP: CTA b/l, no crackles, no wheezes or rhonchi  ABD: soft, NT, ND, BS+, no hepatosplenomegaly  EXT: no deformities, no pedal edema  SKIN: no rashes, warm and dry  NEURO: normal mentation, strength 5/5 on all 4 extremities, no sensory deficits        LABS / IMAGING      6/17/24 WBC 7.52, Hgb 13.2, MCV 94.8, Plt 260, ANC 4.45, Iron 65,  na 133, CMP otherwise WNL    2/26/24 WBC 7.94, Hgb 11.9, MCV 95.7, Plt 298, ANC 4.39, Iron 37, TIBC 293, transferrin 279, Ferritin, 41.3, Iron sat 13, na 135, CMP otherwise WNL  11/22/23: cr 0.78, ca 9, LFTs WNL, alb 3.6, wbc 7.91, hgb 11.7, plt 275, ANC 4.38, iron 52, iron sat 17%, ferritin 47.4    08/21/23: cr 0.86, ca 9.5, LFTs WNL, alb 3.6, wbc 7.30, hgb 11.3, plt 350, ANC 3.82, iron 26, iron sat 8%, ferritin 21.30    03/27/2023:  Creatinine 0.7, albumin 3.4, calcium 9.1, LFTs within normal limits, iron 40, TIBC 324,% saturation 12, ferritin 53, WBC count 8.6, hemoglobin 12.3, MCV 82.5, platelet count 265, normal differential.    03/09/2023:  Creatinine 0.79, albumin 3.6, LFTs within normal limits, iron 49, TIBC 313, % saturation 16, ferritin 54, folic acid 12.9, vitamin B12 343, CRP 0.2, WBC count 7.5, hemoglobin 12.0, MCV 80.7, platelet count 309.  Retic count 1.01.  Sedimentation rate 22.  ZENON not detected.  Copper 129.4.  Erythropoietin 10.        Assessment:     IRON DEFICIENCY ANEMIA:   - Admitted initially in November of 2021 with anemia now noted to have duodenal angiectasia status post APC.  Admitted again with anemia in December of 2022 and then in January of 2023 requiring blood transfusions.  Repeat labs on 02/28/2023 with normal hemoglobin of 13.3  - 01/20/2023 EGD:  Normal esophagus, stomach,  duodenum    Plan:   6/25/24  Patient continues to tolerate oral iron daily w/ vit C to improve iron levels. She remains asymptomatic (denies fatigue, weakness, sob, dizziness).  Plan to follow-up in 4 months with repeat labs including iron panel.    Stacey Santiago FNP-C

## 2024-06-25 ENCOUNTER — OFFICE VISIT (OUTPATIENT)
Dept: HEMATOLOGY/ONCOLOGY | Facility: CLINIC | Age: 69
End: 2024-06-25
Payer: MEDICARE

## 2024-06-25 VITALS
SYSTOLIC BLOOD PRESSURE: 129 MMHG | RESPIRATION RATE: 18 BRPM | HEIGHT: 61 IN | OXYGEN SATURATION: 100 % | WEIGHT: 128.69 LBS | DIASTOLIC BLOOD PRESSURE: 82 MMHG | HEART RATE: 56 BPM | TEMPERATURE: 98 F | BODY MASS INDEX: 24.3 KG/M2

## 2024-06-25 DIAGNOSIS — D50.0 IRON DEFICIENCY ANEMIA DUE TO CHRONIC BLOOD LOSS: Primary | ICD-10-CM

## 2024-06-25 PROCEDURE — 99213 OFFICE O/P EST LOW 20 MIN: CPT | Mod: ,,,

## 2024-06-25 PROCEDURE — 3008F BODY MASS INDEX DOCD: CPT | Mod: CPTII,,,

## 2024-06-25 PROCEDURE — 3079F DIAST BP 80-89 MM HG: CPT | Mod: CPTII,,,

## 2024-06-25 PROCEDURE — 1159F MED LIST DOCD IN RCRD: CPT | Mod: CPTII,,,

## 2024-06-25 PROCEDURE — 3288F FALL RISK ASSESSMENT DOCD: CPT | Mod: CPTII,,,

## 2024-06-25 PROCEDURE — 3074F SYST BP LT 130 MM HG: CPT | Mod: CPTII,,,

## 2024-06-25 PROCEDURE — 4010F ACE/ARB THERAPY RXD/TAKEN: CPT | Mod: CPTII,,,

## 2024-06-25 PROCEDURE — 1101F PT FALLS ASSESS-DOCD LE1/YR: CPT | Mod: CPTII,,,

## 2024-06-25 PROCEDURE — 1160F RVW MEDS BY RX/DR IN RCRD: CPT | Mod: CPTII,,,

## 2024-07-17 DIAGNOSIS — M81.0 OSTEOPOROSIS WITHOUT PATHOLOGICAL FRACTURE: Primary | ICD-10-CM

## 2024-08-07 DIAGNOSIS — I10 HYPERTENSION, UNSPECIFIED TYPE: ICD-10-CM

## 2024-08-07 DIAGNOSIS — I73.9 PAD (PERIPHERAL ARTERY DISEASE): ICD-10-CM

## 2024-08-07 DIAGNOSIS — E78.5 HYPERLIPIDEMIA LDL GOAL <70: ICD-10-CM

## 2024-08-07 DIAGNOSIS — I25.10 CORONARY ARTERY DISEASE, UNSPECIFIED VESSEL OR LESION TYPE, UNSPECIFIED WHETHER ANGINA PRESENT, UNSPECIFIED WHETHER NATIVE OR TRANSPLANTED HEART: ICD-10-CM

## 2024-08-07 RX ORDER — METOPROLOL TARTRATE 25 MG/1
12.5 TABLET, FILM COATED ORAL 2 TIMES DAILY
Qty: 90 TABLET | Refills: 3 | Status: SHIPPED | OUTPATIENT
Start: 2024-08-07 | End: 2025-08-07

## 2024-08-07 RX ORDER — EZETIMIBE 10 MG/1
10 TABLET ORAL DAILY
Qty: 90 TABLET | Refills: 3 | Status: SHIPPED | OUTPATIENT
Start: 2024-08-07 | End: 2025-08-07

## 2024-08-07 RX ORDER — AMLODIPINE AND BENAZEPRIL HYDROCHLORIDE 10; 20 MG/1; MG/1
1 CAPSULE ORAL DAILY
Qty: 90 CAPSULE | Refills: 3 | Status: SHIPPED | OUTPATIENT
Start: 2024-08-07 | End: 2025-08-07

## 2024-08-07 RX ORDER — ASPIRIN 81 MG/1
81 TABLET ORAL DAILY
Qty: 90 TABLET | Refills: 3 | Status: SHIPPED | OUTPATIENT
Start: 2024-08-07

## 2024-08-07 RX ORDER — RIVAROXABAN 2.5 MG/1
1 TABLET, FILM COATED ORAL 2 TIMES DAILY
Qty: 180 TABLET | Refills: 3 | Status: SHIPPED | OUTPATIENT
Start: 2024-08-07 | End: 2025-08-07

## 2024-08-21 ENCOUNTER — TELEPHONE (OUTPATIENT)
Dept: CARDIOLOGY | Facility: CLINIC | Age: 69
End: 2024-08-21
Payer: MEDICARE

## 2024-08-21 NOTE — TELEPHONE ENCOUNTER
----- Message from CARLA Mendoza sent at 8/21/2024  9:00 AM CDT -----  Patient has nonobstructive artery disease and has taken aspirin for many years without any issues.  Please advise patient to continue with aspirin for now.  However if patient develops any concerning symptoms, please advise to stop immediately and seek medical attention.    Thank you !  Nita AGUIRRE- BC  ----- Message -----  From: Steffi Luciano LPN  Sent: 8/21/2024   8:27 AM CDT  To: CARLA Mendoza    Spoke to pt who reports anaphylaxis reaction to naproxen, however she reports taking aspirin with no issues. Please advise.  ----- Message -----  From: Francine Waggoner  Sent: 8/20/2024   9:58 AM CDT  To: [facility] Cardiology Clinical Support Staff    [facility]Carly [facility] on [facility] for Aspirin.  She shows that patient has an allergy to Naprosyn and needs to clarify that patient should be taking this aspirin and that the provider is aware of her allergy.  Phone number to reach Carly is [phone].    Thank you,  Francine

## 2024-08-26 ENCOUNTER — HOSPITAL ENCOUNTER (OUTPATIENT)
Dept: RADIOLOGY | Facility: HOSPITAL | Age: 69
Discharge: HOME | End: 2024-08-26
Attending: NURSE PRACTITIONER
Payer: MEDICARE

## 2024-08-26 DIAGNOSIS — M81.0 OSTEOPOROSIS WITHOUT PATHOLOGICAL FRACTURE: ICD-10-CM

## 2024-08-26 PROCEDURE — 77080 DXA BONE DENSITY AXIAL: CPT | Mod: TC

## 2024-10-28 NOTE — PROGRESS NOTES
"    [facility] - Clinic Note  Magdaleno Alvarez MD      Patient Name: Crystal Lara                   : 1955      MRN: [id number]   Visit Date: 2024       History Present Illness     Reason for Visit: Arterial Disease    Ms. Lara presents to the clinic for 1 year surveillance of lower extremity arterial disease. She is s/p right common femoral endarterectomy 21. She denies claudication with ambulation and is able to ambluate a couple of blocks without discomfort.     She is on iron for her anemia and she has been feeling more energized.    She continues smoking.       REVIEW OF SYSTEMS:  Review of systems conducted, negative except as stated in the history of present illness. See HPI for details.        Physical Exam      Vitals:    24 0926 24 0928   BP: 121/70 130/73   BP Location: Left arm Right arm   Patient Position: Sitting Sitting   Pulse: (!) 51 (!) 50   Weight: 57.2 kg (126 lb)    Height: 5' 1" (1.549 m)           General: well-nourished, no acute distress, and healthy appearing, ambulating normally, alert, pleasant, conversant, and oriented  Neurologic: cranial nerves are grossly intact, no neurologic deficits, no motor deficits, and no sensory deficits  HEENT: grossly normal and no scleral icterus  Neck/Chest: normal  and soft without lymphadenopathy  Respiratory: breathing easily and without respiratory distress  Abdomen: normal and soft  Cardiology: regular rate and rhythm    Upper Extremity Arterial Exam:   Right - radial is palpable  Left - radial is palpable    Lower Extremity Arterial Exam:  Right - posterior tibial is palpable and dorsalis pedis is non-palpable  Left - posterior tibial is palpable and dorsalis pedis is non-palpable    Musculoskeletal:   Lower Extremity: no edema present to bilateral lower extremities    Skin: has no obvious skin abnormality to lower extremities              Assessment and Plan     Ms. Lara is a 69 y.o. woman with lower " extremity arterial disease who is s/p right common femoral endarterectomy on 12/17/21 secondary to rest pain.  She has been doing well symptomatically and is not limited by claudication.  She hasn't had any significant symptomatic change since her previous visit.  She has easily palpable posterior tibial pulses bilaterally (consistent with previous exam).  I do not recommend further testing or procedural intervention at this time. Advise continuing low-dose aspirin and statin therapy. We discussed the detrimental effects of tobacco use on the vascular system and overall health. There was an emphasis on achieving complete cessation. Will follow up in 1 year.       1. Atherosclerosis of arteries of extremities    2. Tobacco use            Imaging Obtained/Reviewed         Medical History     Past Medical History:   Diagnosis Date    Anemia 11/2021    Atherosclerosis of arteries of extremities     Carotid artery stenosis     Coronary artery disease     Hyperlipidemia     Hypertension     PAD (peripheral artery disease)      Past Surgical History:   Procedure Laterality Date    ANGIOGRAPHY  12/15/2021    ANGIOGRAPHY Left 09/09/2019    L external iliac stents, atherectomy of L common femoral and L common iliac    ANGIOGRAPHY Left 08/22/2019    diagnostic imagining  of L external iliac, >90% of R external iliac    AUGMENTATION OF BREAST      CAPSULE ENDOSCOPY, ESOPHAGUS THROUGH ILEUM N/A 01/20/2023    Procedure: CAPSULE ENDOSCOPY, ESOPHAGUS THROUGH ILEUM;  Surgeon: Jose Luis Rodriguez MD;  Location: [facility] ENDOSCOPY;  Service: Gastroenterology;  Laterality: N/A;    CORONARY ANGIOPLASTY      ENDARTERECTOMY, COMMON FEMORAL Right 12/17/2021    Surgeon: Kim    ESOPHAGOGASTRODUODENOSCOPY N/A 01/20/2023    Procedure: EGD/VCE placement;  Surgeon: Jose Luis Rodriguez MD;  Location: [facility] ENDOSCOPY;  Service: Gastroenterology;  Laterality: N/A;    PARTIAL HYSTERECTOMY       Family History   Problem Relation Name Age of Onset     Heart disease Mother      Heart attack Mother      Heart failure Father       Social History     Socioeconomic History    Marital status: Single   Tobacco Use    Smoking status: Every Day     Current packs/day: 0.50     Types: Cigarettes    Smokeless tobacco: Never   Substance and Sexual Activity    Alcohol use: Not Currently    Drug use: Never     Current Outpatient Medications   Medication Instructions    albuterol (PROVENTIL/VENTOLIN HFA) 90 mcg/actuation inhaler inhale TWO puffs into lungs EVERY 6 HOURS AS NEEDED FOR troublesome cough    alendronate (FOSAMAX) 70 MG tablet Take by mouth.    amlodipine-benazepril 10-20mg (LOTREL) 10-20 mg per capsule 1 capsule, Oral, Daily    aspirin (ECOTRIN) 81 mg, Oral, Daily    atorvastatin (LIPITOR) 40 mg, Oral, Nightly    calcium carbonate-vitamin D3 500 mg-10 mcg (400 unit) Tab 1 tablet with a meal    cyclobenzaprine (FLEXERIL) 10 mg, As needed (PRN)    docusate sodium 100 mg capsule 1 tablet, Every other day    esomeprazole magnesium 20 mg TbEC 1 capsule, Nightly    ezetimibe (ZETIA) 10 mg, Oral, Daily    metoprolol tartrate (LOPRESSOR) 12.5 mg, Oral, 2 times daily    nitroGLYCERIN (NITROSTAT) 0.4 mg, Sublingual, Every 5 min PRN    XARELTO 2.5 mg, Oral, 2 times daily     Review of patient's allergies indicates:   Allergen Reactions    Naproxen Swelling       Patient Care Team:  Ana María Herrera FNP as PCP - General (Family Medicine)  Magdaleno Alvarez MD as Vascular Surgery (Vascular Surgery)  Nita Hansen FNP as Nurse Practitioner (Cardiology)  Cammie Bingham NP as Nurse Practitioner (Hematology and Oncology)        No follow-ups on file. In addition to their scheduled follow up, the patient has also been instructed to follow up on as needed basis.     Future Appointments   Date Time Provider Department Center   11/12/2024  2:00 PM Nita Hansen FNP [facility]   5/9/2025 10:00 AM Cammie Bingham NP [facility]    11/11/2025  9:40 AM Magdaleno Alvarez MD [facility]

## 2024-10-29 ENCOUNTER — LAB VISIT (OUTPATIENT)
Dept: LAB | Facility: HOSPITAL | Age: 69
End: 2024-10-29
Payer: MEDICARE

## 2024-10-29 DIAGNOSIS — D50.0 IRON DEFICIENCY ANEMIA DUE TO CHRONIC BLOOD LOSS: ICD-10-CM

## 2024-10-29 LAB
ALBUMIN SERPL-MCNC: 3.4 G/DL (ref 3.4–4.8)
ALBUMIN/GLOB SERPL: 1 RATIO (ref 1.1–2)
ALP SERPL-CCNC: 65 UNIT/L (ref 40–150)
ALT SERPL-CCNC: 17 UNIT/L (ref 0–55)
ANION GAP SERPL CALC-SCNC: 5 MEQ/L
AST SERPL-CCNC: 21 UNIT/L (ref 5–34)
BASOPHILS # BLD AUTO: 0.06 X10(3)/MCL
BASOPHILS NFR BLD AUTO: 0.8 %
BILIRUB SERPL-MCNC: 0.7 MG/DL
BUN SERPL-MCNC: 13.9 MG/DL (ref 9.8–20.1)
CALCIUM SERPL-MCNC: 8.8 MG/DL (ref 8.4–10.2)
CHLORIDE SERPL-SCNC: 100 MMOL/L (ref 98–107)
CO2 SERPL-SCNC: 29 MMOL/L (ref 23–31)
CREAT SERPL-MCNC: 0.84 MG/DL (ref 0.55–1.02)
CREAT/UREA NIT SERPL: 17
EOSINOPHIL # BLD AUTO: 0.32 X10(3)/MCL (ref 0–0.9)
EOSINOPHIL NFR BLD AUTO: 4.4 %
ERYTHROCYTE [DISTWIDTH] IN BLOOD BY AUTOMATED COUNT: 13.2 % (ref 11.5–17)
FERRITIN SERPL-MCNC: 54.3 NG/ML (ref 4.63–204)
FOLATE SERPL-MCNC: 15.7 NG/ML (ref 7–31.4)
GFR SERPLBLD CREATININE-BSD FMLA CKD-EPI: >60 ML/MIN/1.73/M2
GLOBULIN SER-MCNC: 3.3 GM/DL (ref 2.4–3.5)
GLUCOSE SERPL-MCNC: 137 MG/DL (ref 82–115)
HCT VFR BLD AUTO: 38 % (ref 37–47)
HGB BLD-MCNC: 12.4 G/DL (ref 12–16)
IMM GRANULOCYTES # BLD AUTO: 0.02 X10(3)/MCL (ref 0–0.04)
IMM GRANULOCYTES NFR BLD AUTO: 0.3 %
IRON SATN MFR SERPL: 23 % (ref 20–50)
IRON SERPL-MCNC: 65 UG/DL (ref 50–170)
LYMPHOCYTES # BLD AUTO: 2.24 X10(3)/MCL (ref 0.6–4.6)
LYMPHOCYTES NFR BLD AUTO: 30.8 %
MCH RBC QN AUTO: 30.9 PG (ref 27–31)
MCHC RBC AUTO-ENTMCNC: 32.6 G/DL (ref 33–36)
MCV RBC AUTO: 94.8 FL (ref 80–94)
MONOCYTES # BLD AUTO: 0.8 X10(3)/MCL (ref 0.1–1.3)
MONOCYTES NFR BLD AUTO: 11 %
NEUTROPHILS # BLD AUTO: 3.83 X10(3)/MCL (ref 2.1–9.2)
NEUTROPHILS NFR BLD AUTO: 52.7 %
PLATELET # BLD AUTO: 270 X10(3)/MCL (ref 130–400)
PMV BLD AUTO: 9.5 FL (ref 7.4–10.4)
POTASSIUM SERPL-SCNC: 4.7 MMOL/L (ref 3.5–5.1)
PROT SERPL-MCNC: 6.7 GM/DL (ref 5.8–7.6)
RBC # BLD AUTO: 4.01 X10(6)/MCL (ref 4.2–5.4)
SODIUM SERPL-SCNC: 134 MMOL/L (ref 136–145)
TIBC SERPL-MCNC: 213 UG/DL (ref 70–310)
TIBC SERPL-MCNC: 278 UG/DL (ref 250–450)
TRANSFERRIN SERPL-MCNC: 252 MG/DL (ref 173–360)
VIT B12 SERPL-MCNC: 425 PG/ML (ref 213–816)
WBC # BLD AUTO: 7.27 X10(3)/MCL (ref 4.5–11.5)

## 2024-10-29 PROCEDURE — 82728 ASSAY OF FERRITIN: CPT

## 2024-10-29 PROCEDURE — 80053 COMPREHEN METABOLIC PANEL: CPT

## 2024-10-29 PROCEDURE — 83550 IRON BINDING TEST: CPT

## 2024-10-29 PROCEDURE — 82607 VITAMIN B-12: CPT

## 2024-10-29 PROCEDURE — 83540 ASSAY OF IRON: CPT

## 2024-10-29 PROCEDURE — 85025 COMPLETE CBC W/AUTO DIFF WBC: CPT

## 2024-10-29 PROCEDURE — 36415 COLL VENOUS BLD VENIPUNCTURE: CPT

## 2024-10-29 PROCEDURE — 82746 ASSAY OF FOLIC ACID SERUM: CPT

## 2024-11-04 ENCOUNTER — OFFICE VISIT (OUTPATIENT)
Dept: HEMATOLOGY/ONCOLOGY | Facility: CLINIC | Age: 69
End: 2024-11-04
Payer: MEDICARE

## 2024-11-04 VITALS
DIASTOLIC BLOOD PRESSURE: 66 MMHG | WEIGHT: 127.38 LBS | TEMPERATURE: 98 F | BODY MASS INDEX: 24.05 KG/M2 | HEIGHT: 61 IN | RESPIRATION RATE: 14 BRPM | HEART RATE: 54 BPM | OXYGEN SATURATION: 97 % | SYSTOLIC BLOOD PRESSURE: 109 MMHG

## 2024-11-04 DIAGNOSIS — D50.0 IRON DEFICIENCY ANEMIA DUE TO CHRONIC BLOOD LOSS: Primary | ICD-10-CM

## 2024-11-04 PROCEDURE — 4010F ACE/ARB THERAPY RXD/TAKEN: CPT | Mod: CPTII,,,

## 2024-11-04 PROCEDURE — 1126F AMNT PAIN NOTED NONE PRSNT: CPT | Mod: CPTII,,,

## 2024-11-04 PROCEDURE — 1159F MED LIST DOCD IN RCRD: CPT | Mod: CPTII,,,

## 2024-11-04 PROCEDURE — 3008F BODY MASS INDEX DOCD: CPT | Mod: CPTII,,,

## 2024-11-04 PROCEDURE — 1101F PT FALLS ASSESS-DOCD LE1/YR: CPT | Mod: CPTII,,,

## 2024-11-04 PROCEDURE — 3078F DIAST BP <80 MM HG: CPT | Mod: CPTII,,,

## 2024-11-04 PROCEDURE — 3074F SYST BP LT 130 MM HG: CPT | Mod: CPTII,,,

## 2024-11-04 PROCEDURE — 99214 OFFICE O/P EST MOD 30 MIN: CPT | Mod: ,,,

## 2024-11-04 PROCEDURE — 3288F FALL RISK ASSESSMENT DOCD: CPT | Mod: CPTII,,,

## 2024-11-04 RX ORDER — ALENDRONATE SODIUM 70 MG/1
TABLET ORAL
COMMUNITY
Start: 2024-09-24

## 2024-11-04 NOTE — PROGRESS NOTES
HEMATOLOGY / ONCOLOGY   CLINIC NOTE     ONCOLOGICAL HISTORY:     Previous hematologist/oncologist: Dr. Orona    Diagnosis:  - Iron Deficiency Anemia     Treatment History:  - PRBC transfusion     Current Treatment:   - Observation    Subjective:       Chief Complaint: Follow-up (Patient reports no new concerns today. )    JOHN Lara  69 y.o.  female with past medical history significant for CAD, peripheral vascular disease on low-dose Xarelto, hypertension, osteoarthritis, iron-deficiency anemia and prior GI bleeding in 2021 from a duodenal angiectasia who was admitted for low H&H. She did have an EGD in November 2021 which noted duodenal angiectasia that was treated with APC.  Patient was recently gain admitted in January of 2023 with anemia and noted to have hemoglobin of 7.2 with guaiac being positive.  Patient had an EGD done.    Patient denies any abdominal pain, nausea, vomiting, diarrhea, blood in the bowel movements or any dark bowel movements.  Patient now feeling better and denies any symptoms at the moment.  Patient is supposed to follow up with GI for further workup    Interval History:     Today, 11/04/24, patient denies any acute concerns. She denies any fevers, chills, drenching night sweats, decreased appetite, weight loss.  She denies any blood stools, dark tarry stools, easy bruising or bleeding.  She denies any chest pain, shortness of breath, fatigue, weakness, or headaches/dizziness. Patient has follow-up with Dr. Joiner. Labwork WNL today. Patient has been compliant with oral iron.    Past Medical History:   Diagnosis Date    Anemia 11/2021    Atherosclerosis of arteries of extremities     Carotid artery stenosis     Coronary artery disease     Hyperlipidemia     Hypertension     PAD (peripheral artery disease)       Past Surgical History:   Procedure Laterality Date    ANGIOGRAPHY  12/15/2021    ANGIOGRAPHY Left 09/09/2019    L external iliac stents, atherectomy of L  common femoral and L common iliac    ANGIOGRAPHY Left 08/22/2019    diagnostic imagining  of L external iliac, >90% of R external iliac    AUGMENTATION OF BREAST      CAPSULE ENDOSCOPY, ESOPHAGUS THROUGH ILEUM N/A 01/20/2023    Procedure: CAPSULE ENDOSCOPY, ESOPHAGUS THROUGH ILEUM;  Surgeon: Jose Luis Rodriguez MD;  Location: [facility] ENDOSCOPY;  Service: Gastroenterology;  Laterality: N/A;    CORONARY ANGIOPLASTY      ENDARTERECTOMY, COMMON FEMORAL Right 12/17/2021    Surgeon: Kim    ESOPHAGOGASTRODUODENOSCOPY N/A 01/20/2023    Procedure: EGD/VCE placement;  Surgeon: Jose Luis Rodriguez MD;  Location: [facility] ENDOSCOPY;  Service: Gastroenterology;  Laterality: N/A;    PARTIAL HYSTERECTOMY       Social History     Socioeconomic History    Marital status: Single   Tobacco Use    Smoking status: Every Day     Current packs/day: 0.50     Types: Cigarettes    Smokeless tobacco: Never   Substance and Sexual Activity    Alcohol use: Not Currently    Drug use: Never      Family History   Problem Relation Name Age of Onset    Heart disease Mother      Heart attack Mother      Heart failure Father        Review of patient's allergies indicates:   Allergen Reactions    Naproxen Swelling      Review of systems  CONSTITUTIONAL: no fevers, no chills, no weight loss, no fatigue, no weakness  HEMATOLOGIC: no abnormal bleeding, no abnormal bruising, no drenching night sweats  ONCOLOGIC: no new masses or lumps  HEENT: no vision loss, no tinnitus or hearing loss, no nose bleeding, no dysphagia, no odynophagia  CVS: no chest pain, no palpitations, no dyspnea on exertion  RESP: no shortness of breath, no hemoptysis, no cough  GI: no nausea, no vomiting, no diarrhea, no constipation, no melena, no hematochezia, no hematemesis, no abdominal pain, no increase in abdominal girth  : no dysuria, no hematuria, no hesitancy, no scrotal swelling, no discharge  INTEGUMENT: no rashes, no abnormal bruising, no nail pitting, no  hyperpigmentation  NEURO: no falls, no memory loss, no paresthesias or dysesthesias, no urofecal incontinence or retention, no loss of strength on any extremity  MSK: no back pain, no new joint pain, no joint swelling  PSYCH: no suicidal or homicidal ideation, no depression, no insomnia, no anhedonia  ENDOCRINE: no heat or cold intolerance, no polyuria, no polydipsia        Objective:        Vitals:    11/04/24 1117   BP: 109/66   Pulse: (!) 54   Resp: 14   Temp: 98 °F (36.7 °C)              Physical Exam:  ECOG PS 0  GA: AAOx3, NAD  HEENT: NCAT, PERRLA, EOMI, good dentition, moist oral mucous membranes  LYMPH: no cervical, axillary or supraclavicular adenopathy  CVS: s1s2 RRR, no M/R/G  RESP: CTA b/l, no crackles, no wheezes or rhonchi  ABD: soft, NT, ND, BS+, no hepatosplenomegaly  EXT: no deformities, no pedal edema  SKIN: no rashes, warm and dry  NEURO: normal mentation, strength 5/5 on all 4 extremities, no sensory deficits        LABS / IMAGING      10/29/24: wbc 7.27, hgb 12.6, plt 270, ANC 3.83, iron 65, iron sat 23%, ferritin 54.3, folate 15.7, B12 425, CMP unremarkable.  6/17/24 WBC 7.52, Hgb 13.2, MCV 94.8, Plt 260, ANC 4.45, Iron 65,  na 133, CMP otherwise WNL    2/26/24 WBC 7.94, Hgb 11.9, MCV 95.7, Plt 298, ANC 4.39, Iron 37, TIBC 293, transferrin 279, Ferritin, 41.3, Iron sat 13, na 135, CMP otherwise WNL  11/22/23: cr 0.78, ca 9, LFTs WNL, alb 3.6, wbc 7.91, hgb 11.7, plt 275, ANC 4.38, iron 52, iron sat 17%, ferritin 47.4    08/21/23: cr 0.86, ca 9.5, LFTs WNL, alb 3.6, wbc 7.30, hgb 11.3, plt 350, ANC 3.82, iron 26, iron sat 8%, ferritin 21.30    03/27/2023:  Creatinine 0.7, albumin 3.4, calcium 9.1, LFTs within normal limits, iron 40, TIBC 324,% saturation 12, ferritin 53, WBC count 8.6, hemoglobin 12.3, MCV 82.5, platelet count 265, normal differential.    03/09/2023:  Creatinine 0.79, albumin 3.6, LFTs within normal limits, iron 49, TIBC 313, % saturation 16, ferritin 54, folic acid 12.9,  vitamin B12 343, CRP 0.2, WBC count 7.5, hemoglobin 12.0, MCV 80.7, platelet count 309.  Retic count 1.01.  Sedimentation rate 22.  ZENON not detected.  Copper 129.4.  Erythropoietin 10.        Assessment:     IRON DEFICIENCY ANEMIA:   - Admitted initially in November of 2021 with anemia now noted to have duodenal angiectasia status post APC.  Admitted again with anemia in December of 2022 and then in January of 2023 requiring blood transfusions.  Repeat labs on 02/28/2023 with normal hemoglobin of 13.3  - 01/20/2023 EGD:  Normal esophagus, stomach, duodenum    Plan:     Patient continues to tolerate oral iron daily w/ vit C to improve iron levels. She remains asymptomatic (denies fatigue, weakness, sob, dizziness).  Plan to follow-up in 6  months with repeat labs including iron panel.

## 2024-11-05 ENCOUNTER — OFFICE VISIT (OUTPATIENT)
Dept: VASCULAR SURGERY | Facility: CLINIC | Age: 69
End: 2024-11-05
Payer: MEDICARE

## 2024-11-05 VITALS
DIASTOLIC BLOOD PRESSURE: 73 MMHG | WEIGHT: 126 LBS | HEART RATE: 50 BPM | BODY MASS INDEX: 23.79 KG/M2 | HEIGHT: 61 IN | SYSTOLIC BLOOD PRESSURE: 130 MMHG

## 2024-11-05 DIAGNOSIS — I70.209 ATHEROSCLEROSIS OF ARTERIES OF EXTREMITIES: Primary | ICD-10-CM

## 2024-11-05 DIAGNOSIS — Z72.0 TOBACCO USE: ICD-10-CM

## 2024-11-05 PROCEDURE — 3075F SYST BP GE 130 - 139MM HG: CPT | Mod: CPTII,,, | Performed by: SPECIALIST

## 2024-11-05 PROCEDURE — 1159F MED LIST DOCD IN RCRD: CPT | Mod: CPTII,,, | Performed by: SPECIALIST

## 2024-11-05 PROCEDURE — 1160F RVW MEDS BY RX/DR IN RCRD: CPT | Mod: CPTII,,, | Performed by: SPECIALIST

## 2024-11-05 PROCEDURE — 3288F FALL RISK ASSESSMENT DOCD: CPT | Mod: CPTII,,, | Performed by: SPECIALIST

## 2024-11-05 PROCEDURE — 99213 OFFICE O/P EST LOW 20 MIN: CPT | Mod: ,,, | Performed by: SPECIALIST

## 2024-11-05 PROCEDURE — 1101F PT FALLS ASSESS-DOCD LE1/YR: CPT | Mod: CPTII,,, | Performed by: SPECIALIST

## 2024-11-05 PROCEDURE — 3078F DIAST BP <80 MM HG: CPT | Mod: CPTII,,, | Performed by: SPECIALIST

## 2024-11-05 PROCEDURE — 4010F ACE/ARB THERAPY RXD/TAKEN: CPT | Mod: CPTII,,, | Performed by: SPECIALIST

## 2024-11-05 PROCEDURE — 3008F BODY MASS INDEX DOCD: CPT | Mod: CPTII,,, | Performed by: SPECIALIST

## 2024-11-11 NOTE — PROGRESS NOTES
CHIEF COMPLAINT:   Chief Complaint   Patient presents with    Follow-up     Denies any cardiac problems                   Review of patient's allergies indicates:   Allergen Reactions    Naproxen Swelling                                          HPI:  Crystal Lara 69 y.o. female with a past medical history of Nonobstructive CAD per [facility] 2016, PAD s/p common femoral endarterectomy (2021), carotid artery stenosis (<50%  stenosis to BICAs), HTN, HLD, hx GI Bleed and Tobacco Use who presents to cardiology clinic for routine follow up and results of testing.  Echocardiogram obtained on 50-55%, Grade I Diastolic Dysfucntion, mild AR, and mild MR, which is improved from previous EF of 45% per ECHO in 2021.   Most recent ischemic evaluation includes a Left Heart Catheterization in 2016 that revealed non obstructive CAD (LAD-50% distal stenosis; Circumflex 30% proximal stenosis, RCA with 40% stenosis).  Medication management was recommended at that time with plans for possible FFR of LAD if patient developed any recurrent angina. (See operative report below).     Patient presents to clinic today reporting that she feels good.  She denies any cardiovascular complaints of chest pain, shortness of breath, palpitations, orthopnea, PND, peripheral edema, claudication, near-syncope or syncope.  The patient is able to perform her regular activities including heavy housework without experiencing any anginal or anginal equivalent symptoms.  She states she also recently wash her walls by utilizing a ladder and denies experiencing any ischemic symptoms.  She remains compliant with her current medications and is currently tolerating without any issues.  She continues to smoke approximately 5-6 cigarettes a day and is not ready to quit at this time.  However, the patient states she usually decreases the amount of cigarettes she smokes during the winter because she only smokes outdoors and does she not like to go out in the cold weather.   She continues to follow with Dr. Alvarez for PAD         Background Information:  At a previous office visit on 11.10.21, patient's right lower extremity DP/PT was not palpable or dopplerable. Dr. Samson was contacted with plans to perform angiogram on 11.11.21.  However, upon arrival at [facility] the patient was found to be anemic with hemoglobin of 6.8 and was transfused 2 units of PRBC.  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   Arterial ultrasound performed at that time which revealed severe decrease in arterial flow in the right lower extremity.  Ultimately, the patient underwent Common femoral endarterectomy. Echo obtained during that admission revealed LVEF 45%.   To note the patient had subsequent Hospitalizations with GI Bleed in Dec. 2022 and January 2023     CARDIAC TESTING:  ECHO 3.8.24       Left Ventricle: The left ventricle is normal in size. Normal wall motion. There is low normal systolic function with a visually estimated ejection fraction of 50 - 55%. Grade I diastolic dysfunction.    Right Ventricle: Normal right ventricular cavity size. Systolic function is normal.    Aortic Valve: There is mild aortic regurgitation.    Mitral Valve: There is mild regurgitation.    Pulmonary Artery: No pulmonary hypertension    ECHO 12.17.21  There is mild hypokinesis/slightly aneurysmal basal inferior wall motion.  EF is stable with mildly depressed EF estimated at 45%   Normal right ventricular size with preserved RV function  Trileaflet with trace AI   Normal structure with trace tricuspid valvular regurgitation noted    [facility] 4.13.16  Procedure summary  LAD with 50% distal stenosis with moderate atherosclerotic plaque  Circumflex with 30% proximal stenosis  RCA with 40% stenosis distal mid and moderate eccentric atherosclerotic plaque   Normal LVEF 60%    Recommendations   Medical management   Aggressive risk factor management   Consider FFR of LAD if develops recurrent angina                                                                                                                                                                                    Patient Active Problem List   Diagnosis    PAD (peripheral artery disease)    Hyperlipidemia LDL goal <70    Hypertension    Smoking    Symptomatic anemia    Tobacco use    Iron deficiency anemia due to chronic blood loss     Past Surgical History:   Procedure Laterality Date    ANGIOGRAPHY  12/15/2021    ANGIOGRAPHY Left 09/09/2019    L external iliac stents, atherectomy of L common femoral and L common iliac    ANGIOGRAPHY Left 08/22/2019    diagnostic imagining  of L external iliac, >90% of R external iliac    AUGMENTATION OF BREAST      CAPSULE ENDOSCOPY, ESOPHAGUS THROUGH ILEUM N/A 01/20/2023    Procedure: CAPSULE ENDOSCOPY, ESOPHAGUS THROUGH ILEUM;  Surgeon: Jose Luis Rodriguez MD;  Location: [facility] ENDOSCOPY;  Service: Gastroenterology;  Laterality: N/A;    CORONARY ANGIOPLASTY      ENDARTERECTOMY, COMMON FEMORAL Right 12/17/2021    Surgeon: Adinami    ESOPHAGOGASTRODUODENOSCOPY N/A 01/20/2023    Procedure: EGD/VCE placement;  Surgeon: Jose Luis Rodriguez MD;  Location: [facility] ENDOSCOPY;  Service: Gastroenterology;  Laterality: N/A;    PARTIAL HYSTERECTOMY       Social History     Socioeconomic History    Marital status: Single   Tobacco Use    Smoking status: Every Day     Current packs/day: 0.50     Types: Cigarettes    Smokeless tobacco: Never   Substance and Sexual Activity    Alcohol use: Not Currently    Drug use: Never        Family History   Problem Relation Name Age of Onset    Heart disease Mother      Heart attack Mother      Heart failure Father           Current Outpatient Medications:     albuterol (PROVENTIL/VENTOLIN HFA) 90 mcg/actuation inhaler, inhale TWO puffs into lungs EVERY 6 HOURS AS  NEEDED FOR troublesome cough, Disp: , Rfl:     alendronate (FOSAMAX) 70 MG tablet, Take by mouth., Disp: , Rfl:     amlodipine-benazepril 10-20mg (LOTREL) 10-20 mg per capsule, Take 1 capsule by mouth once daily., Disp: 90 capsule, Rfl: 3    aspirin (ECOTRIN) 81 MG EC tablet, Take 1 tablet (81 mg total) by mouth Daily., Disp: 90 tablet, Rfl: 3    atorvastatin (LIPITOR) 40 MG tablet, Take 1 tablet (40 mg total) by mouth nightly., Disp: 90 tablet, Rfl: 3    cyclobenzaprine (FLEXERIL) 10 MG tablet, Take 10 mg by mouth as needed., Disp: , Rfl:     docusate sodium 100 mg capsule, Take 1 tablet by mouth every other day., Disp: , Rfl:     ezetimibe (ZETIA) 10 mg tablet, Take 1 tablet (10 mg total) by mouth Daily., Disp: 90 tablet, Rfl: 3    metoprolol tartrate (LOPRESSOR) 25 MG tablet, Take 0.5 tablets (12.5 mg total) by mouth 2 (two) times a day., Disp: 90 tablet, Rfl: 3    nitroGLYCERIN (NITROSTAT) 0.4 MG SL tablet, Place 1 tablet (0.4 mg total) under the tongue every 5 (five) minutes as needed for Chest pain., Disp: 25 tablet, Rfl: 3    rivaroxaban (XARELTO) 2.5 mg Tab, Take 1 tablet (2.5 mg total) by mouth 2 (two) times a day., Disp: 180 tablet, Rfl: 3    calcium carbonate-vitamin D3 500 mg-10 mcg (400 unit) Tab, 1 tablet with a meal (Patient not taking: Reported on 11/12/2024), Disp: , Rfl:     esomeprazole magnesium 20 mg TbEC, Take 1 capsule by mouth nightly. (Patient not taking: Reported on 11/12/2024), Disp: , Rfl:      ROS:                                                                                                                                                                             Review of Systems   Constitutional: Negative.    Respiratory: Negative.     Cardiovascular: Negative.    Gastrointestinal: Negative.    Musculoskeletal: Negative.    Skin: Negative.    Neurological: Negative.    Psychiatric/Behavioral: Negative.          Blood pressure 123/63, pulse 61, temperature 98 °F (36.7 °C),  "temperature source Oral, resp. rate (!) 29, height 5' 2" (1.575 m), weight 58.3 kg (128 lb 9.6 oz), SpO2 95%.   PE:  Physical Exam  Constitutional:       Appearance: Normal appearance.   HENT:      Head: Normocephalic and atraumatic.   Eyes:      Extraocular Movements: Extraocular movements intact.      Pupils: Pupils are equal, round, and reactive to light.   Cardiovascular:      Rate and Rhythm: Normal rate and regular rhythm.   Pulmonary:      Effort: Pulmonary effort is normal.      Breath sounds: Normal breath sounds.   Abdominal:      Palpations: Abdomen is soft.   Musculoskeletal:         General: Normal range of motion.      Cervical back: Normal range of motion.   Skin:     General: Skin is warm and dry.   Neurological:      General: No focal deficit present.      Mental Status: She is alert and oriented to person, place, and time.   Psychiatric:         Mood and Affect: Mood normal.         Behavior: Behavior normal.          ASSESSMENT/PLAN:  Nonobstructive CAD per [facility] 2016  - [facility] - Nonobstructive CAD-LAD 50% stenosis, left circumflex 30% proximal stenosis, RCA 40% stenosis.  Recommended medical management at the time with consideration for FFR of LAD if patient developed recurrent angina. (4.13.16)  - EF-50-55%, Grade I DD, mild AR and mild MR per ECHO 3.8.24  - EF -4 5% per ECHO 12.17.21  - Denies any CP or SOB  - Continue Aspirin, Atorvastatin 40 mg, Lotrel 10-20 mg, Metoprolol Tartrate 12.5 mg BID and SL nitro PRN CP  - Counseled on heart healthy diet, exercise as tolerated, and smoking cessation    PAD s/p common femoral endarterectomy (2021)  - Arterial ultrasound -severe decrease in arterial flow in the right lower extremity. Ultrasound was negative for DVT. (11.11.21)   - Denies Claudication symptoms   - Continue ASA. Defer recommendations on continuation of Xarelto to Dr. Alvarez who patient currently follows for the PAD  - Follow up with Dr. Alvarez as directed     Hypertension  - BP at goal " 123/63  - Continue Metoprolol Tartrate 12.5 mg BID and Lotrel 10-20 mg daily   - Counseled on low-salt diet and exercise as tolerated    HLD  - LDL at goal -65  - Continue Lipitor 40 mg and Zetia 10mg. Reports intolerance to lipitor 80mg in the past  - Advised on heart healthy, low-fat low-cholesterol diet and exercise as tolerated  - Repeat labs prior to next clinic visit     Carotid Artery Stenosis - Asymptomatic   - Carotid US- less than 50% stenosis to bilateral internal carotid arteries (Sept. 2023)    Tobacco Use  - Patient currently smokes 5-6 cigarettes per day and is not ready to quit at this time  - Counseled on the importance of smoking cessation    Hx of GI bleed in Dec. 2021, Dec. 2022 and Jan. 2023  - Continue to Follow with GI clinic as directed     Anemia- stable   - Continue management per hematology       Follow up in Cardiology Clinic in 6 months with CMP/FLP or sooner if needed   Follow-up with PCP, GI, hematology and Dr. Alvarez as directed

## 2024-11-12 ENCOUNTER — OFFICE VISIT (OUTPATIENT)
Dept: CARDIOLOGY | Facility: CLINIC | Age: 69
End: 2024-11-12
Payer: MEDICARE

## 2024-11-12 VITALS
WEIGHT: 128.63 LBS | BODY MASS INDEX: 23.67 KG/M2 | HEART RATE: 61 BPM | SYSTOLIC BLOOD PRESSURE: 123 MMHG | RESPIRATION RATE: 29 BRPM | OXYGEN SATURATION: 95 % | TEMPERATURE: 98 F | DIASTOLIC BLOOD PRESSURE: 63 MMHG | HEIGHT: 62 IN

## 2024-11-12 DIAGNOSIS — I73.9 PAD (PERIPHERAL ARTERY DISEASE): ICD-10-CM

## 2024-11-12 DIAGNOSIS — F17.200 SMOKING: ICD-10-CM

## 2024-11-12 DIAGNOSIS — Z72.0 TOBACCO USE: ICD-10-CM

## 2024-11-12 DIAGNOSIS — E78.5 HYPERLIPIDEMIA LDL GOAL <70: ICD-10-CM

## 2024-11-12 DIAGNOSIS — I10 PRIMARY HYPERTENSION: ICD-10-CM

## 2024-11-12 DIAGNOSIS — I25.10 CORONARY ARTERY DISEASE INVOLVING NATIVE CORONARY ARTERY OF NATIVE HEART WITHOUT ANGINA PECTORIS: Primary | ICD-10-CM

## 2024-11-12 PROCEDURE — 1159F MED LIST DOCD IN RCRD: CPT | Mod: CPTII,,, | Performed by: NURSE PRACTITIONER

## 2024-11-12 PROCEDURE — 1101F PT FALLS ASSESS-DOCD LE1/YR: CPT | Mod: CPTII,,, | Performed by: NURSE PRACTITIONER

## 2024-11-12 PROCEDURE — 3288F FALL RISK ASSESSMENT DOCD: CPT | Mod: CPTII,,, | Performed by: NURSE PRACTITIONER

## 2024-11-12 PROCEDURE — 3008F BODY MASS INDEX DOCD: CPT | Mod: CPTII,,, | Performed by: NURSE PRACTITIONER

## 2024-11-12 PROCEDURE — 99214 OFFICE O/P EST MOD 30 MIN: CPT | Mod: S$PBB,,, | Performed by: NURSE PRACTITIONER

## 2024-11-12 PROCEDURE — 1160F RVW MEDS BY RX/DR IN RCRD: CPT | Mod: CPTII,,, | Performed by: NURSE PRACTITIONER

## 2024-11-12 PROCEDURE — 99215 OFFICE O/P EST HI 40 MIN: CPT | Mod: PBBFAC | Performed by: NURSE PRACTITIONER

## 2024-11-12 PROCEDURE — 3078F DIAST BP <80 MM HG: CPT | Mod: CPTII,,, | Performed by: NURSE PRACTITIONER

## 2024-11-12 PROCEDURE — 4010F ACE/ARB THERAPY RXD/TAKEN: CPT | Mod: CPTII,,, | Performed by: NURSE PRACTITIONER

## 2024-11-12 PROCEDURE — 3074F SYST BP LT 130 MM HG: CPT | Mod: CPTII,,, | Performed by: NURSE PRACTITIONER

## 2024-11-12 RX ORDER — ATORVASTATIN CALCIUM 40 MG/1
40 TABLET, FILM COATED ORAL NIGHTLY
Qty: 90 TABLET | Refills: 3 | Status: SHIPPED | OUTPATIENT
Start: 2024-11-12 | End: 2025-11-12

## 2024-11-12 NOTE — PATIENT INSTRUCTIONS
Follow up in Cardiology Clinic in 6 months with CMP/FLP or sooner if needed   Follow-up with PCP, GI, hematology and Dr. Alvarez as directed

## 2025-04-30 ENCOUNTER — LAB VISIT (OUTPATIENT)
Dept: LAB | Facility: HOSPITAL | Age: 70
End: 2025-04-30
Payer: MEDICARE

## 2025-04-30 DIAGNOSIS — D50.0 IRON DEFICIENCY ANEMIA SECONDARY TO BLOOD LOSS (CHRONIC): Primary | ICD-10-CM

## 2025-04-30 LAB
ALBUMIN SERPL-MCNC: 3.3 G/DL (ref 3.4–4.8)
ALBUMIN/GLOB SERPL: 0.8 RATIO (ref 1.1–2)
ALP SERPL-CCNC: 58 UNIT/L (ref 40–150)
ALT SERPL-CCNC: 16 UNIT/L (ref 0–55)
ANION GAP SERPL CALC-SCNC: 4 MEQ/L
AST SERPL-CCNC: 20 UNIT/L (ref 11–45)
BASOPHILS # BLD AUTO: 0.13 X10(3)/MCL
BASOPHILS NFR BLD AUTO: 2 %
BILIRUB SERPL-MCNC: 0.5 MG/DL
BUN SERPL-MCNC: 13.7 MG/DL (ref 9.8–20.1)
CALCIUM SERPL-MCNC: 8.7 MG/DL (ref 8.4–10.2)
CHLORIDE SERPL-SCNC: 100 MMOL/L (ref 98–107)
CO2 SERPL-SCNC: 29 MMOL/L (ref 23–31)
CREAT SERPL-MCNC: 0.77 MG/DL (ref 0.55–1.02)
CREAT/UREA NIT SERPL: 18
EOSINOPHIL # BLD AUTO: 0.24 X10(3)/MCL (ref 0–0.9)
EOSINOPHIL NFR BLD AUTO: 3.7 %
ERYTHROCYTE [DISTWIDTH] IN BLOOD BY AUTOMATED COUNT: 13.6 % (ref 11.5–17)
FERRITIN SERPL-MCNC: 55.3 NG/ML (ref 4.63–204)
FOLATE SERPL-MCNC: 15.6 NG/ML (ref 7–31.4)
GFR SERPLBLD CREATININE-BSD FMLA CKD-EPI: >60 ML/MIN/1.73/M2
GLOBULIN SER-MCNC: 3.9 GM/DL (ref 2.4–3.5)
GLUCOSE SERPL-MCNC: 85 MG/DL (ref 82–115)
HCT VFR BLD AUTO: 39.9 % (ref 37–47)
HGB BLD-MCNC: 12.9 G/DL (ref 12–16)
IMM GRANULOCYTES # BLD AUTO: 0.03 X10(3)/MCL (ref 0–0.04)
IMM GRANULOCYTES NFR BLD AUTO: 0.5 %
IRON SATN MFR SERPL: 16 % (ref 20–50)
IRON SERPL-MCNC: 40 UG/DL (ref 50–170)
LYMPHOCYTES # BLD AUTO: 1.85 X10(3)/MCL (ref 0.6–4.6)
LYMPHOCYTES NFR BLD AUTO: 28.2 %
MCH RBC QN AUTO: 30.1 PG (ref 27–31)
MCHC RBC AUTO-ENTMCNC: 32.3 G/DL (ref 33–36)
MCV RBC AUTO: 93.2 FL (ref 80–94)
MONOCYTES # BLD AUTO: 1.18 X10(3)/MCL (ref 0.1–1.3)
MONOCYTES NFR BLD AUTO: 18 %
NEUTROPHILS # BLD AUTO: 3.14 X10(3)/MCL (ref 2.1–9.2)
NEUTROPHILS NFR BLD AUTO: 47.6 %
PLATELET # BLD AUTO: 277 X10(3)/MCL (ref 130–400)
PMV BLD AUTO: 10.1 FL (ref 7.4–10.4)
POTASSIUM SERPL-SCNC: 4.7 MMOL/L (ref 3.5–5.1)
PROT SERPL-MCNC: 7.2 GM/DL (ref 5.8–7.6)
RBC # BLD AUTO: 4.28 X10(6)/MCL (ref 4.2–5.4)
SODIUM SERPL-SCNC: 133 MMOL/L (ref 136–145)
TIBC SERPL-MCNC: 218 UG/DL (ref 70–310)
TIBC SERPL-MCNC: 258 UG/DL (ref 250–450)
TRANSFERRIN SERPL-MCNC: 245 MG/DL (ref 173–360)
VIT B12 SERPL-MCNC: 336 PG/ML (ref 213–816)
WBC # BLD AUTO: 6.57 X10(3)/MCL (ref 4.5–11.5)

## 2025-04-30 PROCEDURE — 36415 COLL VENOUS BLD VENIPUNCTURE: CPT

## 2025-04-30 PROCEDURE — 82607 VITAMIN B-12: CPT

## 2025-04-30 PROCEDURE — 85025 COMPLETE CBC W/AUTO DIFF WBC: CPT

## 2025-04-30 PROCEDURE — 80053 COMPREHEN METABOLIC PANEL: CPT

## 2025-04-30 PROCEDURE — 83540 ASSAY OF IRON: CPT

## 2025-04-30 PROCEDURE — 82746 ASSAY OF FOLIC ACID SERUM: CPT

## 2025-04-30 PROCEDURE — 82728 ASSAY OF FERRITIN: CPT

## 2025-05-09 ENCOUNTER — OFFICE VISIT (OUTPATIENT)
Dept: HEMATOLOGY/ONCOLOGY | Facility: CLINIC | Age: 70
End: 2025-05-09
Payer: MEDICARE

## 2025-05-09 VITALS — BODY MASS INDEX: 22.82 KG/M2 | HEIGHT: 62 IN | WEIGHT: 124 LBS

## 2025-05-09 DIAGNOSIS — D50.0 IRON DEFICIENCY ANEMIA DUE TO CHRONIC BLOOD LOSS: Primary | ICD-10-CM

## 2025-05-09 NOTE — PROGRESS NOTES
Audio Only Telehealth Visit     The patient location is: home  The chief complaint leading to consultation is: STAS  Visit type: Virtual visit with audio only (telephone)  Total time spent in medical discussion with patient: 10 minutes  Total time spent on date of the encounter:10 minutes       The reason for the audio only service rather than synchronous audio and video virtual visit was related to technical difficulties or patient preference/necessity.       Each patient to whom I provide medical services by telemedicine is:  (1) informed of the relationship between the physician and patient and the respective role of any other health care provider with respect to management of the patient; and (2) notified that they may decline to receive medical services by telemedicine and may withdraw from such care at any time. Patient verbally consented to receive this service via voice-only telephone call.           HEMATOLOGY / ONCOLOGY   CLINIC NOTE     ONCOLOGICAL HISTORY:     Previous hematologist/oncologist: Dr. Orona    Diagnosis:  - Iron Deficiency Anemia     Treatment History:  - PRBC transfusion     Current Treatment:   - Observation    Subjective:       Chief Complaint: Results    HPI      Crystal Lara  69 y.o.  female with past medical history significant for CAD, peripheral vascular disease on low-dose Xarelto, hypertension, osteoarthritis, iron-deficiency anemia and prior GI bleeding in 2021 from a duodenal angiectasia who was admitted for low H&H. She did have an EGD in November 2021 which noted duodenal angiectasia that was treated with APC.  Patient was recently gain admitted in January of 2023 with anemia and noted to have hemoglobin of 7.2 with guaiac being positive.  Patient had an EGD done.    Patient denies any abdominal pain, nausea, vomiting, diarrhea, blood in the bowel movements or any dark bowel movements.  Patient now feeling better and denies any symptoms at the moment.  Patient is supposed to  follow up with GI for further workup    Interval History:     Today, 05/09/25, patient is telephone and denies any acute concerns. She denies any fevers, chills, drenching night sweats, decreased appetite, weight loss.  She denies any blood stools, dark tarry stools, easy bruising or bleeding.  She denies any chest pain, shortness of breath, fatigue, weakness, or headaches/dizziness. Patient has follow-up with Dr. Joiner. Labwork WNL today. Patient has been compliant with oral iron.    Past Medical History:   Diagnosis Date    Anemia 11/2021    Atherosclerosis of arteries of extremities     Carotid artery stenosis     Coronary artery disease     Hyperlipidemia     Hypertension     PAD (peripheral artery disease)       Past Surgical History:   Procedure Laterality Date    ANGIOGRAPHY  12/15/2021    ANGIOGRAPHY Left 09/09/2019    L external iliac stents, atherectomy of L common femoral and L common iliac    ANGIOGRAPHY Left 08/22/2019    diagnostic imagining  of L external iliac, >90% of R external iliac    AUGMENTATION OF BREAST      CAPSULE ENDOSCOPY, ESOPHAGUS THROUGH ILEUM N/A 01/20/2023    Procedure: CAPSULE ENDOSCOPY, ESOPHAGUS THROUGH ILEUM;  Surgeon: Jose Luis Rodriguez MD;  Location: [facility] ENDOSCOPY;  Service: Gastroenterology;  Laterality: N/A;    CORONARY ANGIOPLASTY      ENDARTERECTOMY, COMMON FEMORAL Right 12/17/2021    Surgeon: Kim    ESOPHAGOGASTRODUODENOSCOPY N/A 01/20/2023    Procedure: EGD/VCE placement;  Surgeon: Jose Luis Rodriguez MD;  Location: [facility] ENDOSCOPY;  Service: Gastroenterology;  Laterality: N/A;    PARTIAL HYSTERECTOMY       Social History     Socioeconomic History    Marital status: Single   Tobacco Use    Smoking status: Every Day     Current packs/day: 0.50     Types: Cigarettes    Smokeless tobacco: Never   Substance and Sexual Activity    Alcohol use: Not Currently    Drug use: Never      Family History   Problem Relation Name Age of Onset    Heart disease Mother      Heart  attack Mother      Heart failure Father        Review of patient's allergies indicates:   Allergen Reactions    Naproxen Swelling      Review of systems  CONSTITUTIONAL: no fevers, no chills, no weight loss, no fatigue, no weakness  HEMATOLOGIC: no abnormal bleeding, no abnormal bruising, no drenching night sweats  ONCOLOGIC: no new masses or lumps  HEENT: no vision loss, no tinnitus or hearing loss, no nose bleeding, no dysphagia, no odynophagia  CVS: no chest pain, no palpitations, no dyspnea on exertion  RESP: no shortness of breath, no hemoptysis, no cough  GI: no nausea, no vomiting, no diarrhea, no constipation, no melena, no hematochezia, no hematemesis, no abdominal pain, no increase in abdominal girth  : no dysuria, no hematuria, no hesitancy, no scrotal swelling, no discharge  INTEGUMENT: no rashes, no abnormal bruising, no nail pitting, no hyperpigmentation  NEURO: no falls, no memory loss, no paresthesias or dysesthesias, no urofecal incontinence or retention, no loss of strength on any extremity  MSK: no back pain, no new joint pain, no joint swelling  PSYCH: no suicidal or homicidal ideation, no depression, no insomnia, no anhedonia  ENDOCRINE: no heat or cold intolerance, no polyuria, no polydipsia        Objective:        There were no vitals filed for this visit.             Physical Exam:  ECOG PS 0  GA: AAOx3, NAD  HEENT: NCAT, PERRLA, EOMI, good dentition, moist oral mucous membranes  LYMPH: no cervical, axillary or supraclavicular adenopathy  CVS: s1s2 RRR, no M/R/G  RESP: CTA b/l, no crackles, no wheezes or rhonchi  ABD: soft, NT, ND, BS+, no hepatosplenomegaly  EXT: no deformities, no pedal edema  SKIN: no rashes, warm and dry  NEURO: normal mentation, strength 5/5 on all 4 extremities, no sensory deficits        LABS / IMAGING      Lab Visit on 04/30/2025   Component Date Value Ref Range Status    Iron Binding Capacity Unsaturated 04/30/2025 218  70 - 310 ug/dL Final    Iron Level  04/30/2025 40 (L)  50 - 170 ug/dL Final    Transferrin 04/30/2025 245  173 - 360 mg/dL Final    Iron Binding Capacity Total 04/30/2025 258  250 - 450 ug/dL Final    Iron Saturation 04/30/2025 16 (L)  20 - 50 % Final    Ferritin Level 04/30/2025 55.30  4.63 - 204.00 ng/mL Final    Folate Level 04/30/2025 15.6  7.0 - 31.4 ng/mL Final    Vitamin B12 04/30/2025 336  213 - 816 pg/mL Final    Sodium 04/30/2025 133 (L)  136 - 145 mmol/L Final    Potassium 04/30/2025 4.7  3.5 - 5.1 mmol/L Final    Chloride 04/30/2025 100  98 - 107 mmol/L Final    CO2 04/30/2025 29  23 - 31 mmol/L Final    Glucose 04/30/2025 85  82 - 115 mg/dL Final    Blood Urea Nitrogen 04/30/2025 13.7  9.8 - 20.1 mg/dL Final    Creatinine 04/30/2025 0.77  0.55 - 1.02 mg/dL Final    Calcium 04/30/2025 8.7  8.4 - 10.2 mg/dL Final    Protein Total 04/30/2025 7.2  5.8 - 7.6 gm/dL Final    Albumin 04/30/2025 3.3 (L)  3.4 - 4.8 g/dL Final    Globulin 04/30/2025 3.9 (H)  2.4 - 3.5 gm/dL Final    Albumin/Globulin Ratio 04/30/2025 0.8 (L)  1.1 - 2.0 ratio Final    Bilirubin Total 04/30/2025 0.5  <=1.5 mg/dL Final    ALP 04/30/2025 58  40 - 150 unit/L Final    ALT 04/30/2025 16  0 - 55 unit/L Final    AST 04/30/2025 20  11 - 45 unit/L Final    eGFR 04/30/2025 >60  mL/min/1.73/m2 Final    Estimated GFR calculated using the CKD-EPI creatinine (2021) equation.    Anion Gap 04/30/2025 4.0  mEq/L Final    BUN/Creatinine Ratio 04/30/2025 18   Final    WBC 04/30/2025 6.57  4.50 - 11.50 x10(3)/mcL Final    RBC 04/30/2025 4.28  4.20 - 5.40 x10(6)/mcL Final    Hgb 04/30/2025 12.9  12.0 - 16.0 g/dL Final    Hct 04/30/2025 39.9  37.0 - 47.0 % Final    MCV 04/30/2025 93.2  80.0 - 94.0 fL Final    MCH 04/30/2025 30.1  27.0 - 31.0 pg Final    MCHC 04/30/2025 32.3 (L)  33.0 - 36.0 g/dL Final    RDW 04/30/2025 13.6  11.5 - 17.0 % Final    Platelet 04/30/2025 277  130 - 400 x10(3)/mcL Final    MPV 04/30/2025 10.1  7.4 - 10.4 fL Final    Neut % 04/30/2025 47.6  % Final    Lymph %  04/30/2025 28.2  % Final    Mono % 04/30/2025 18.0  % Final    Eos % 04/30/2025 3.7  % Final    Basophil % 04/30/2025 2.0  % Final    Imm Grans % 04/30/2025 0.5  % Final    Neut # 04/30/2025 3.14  2.1 - 9.2 x10(3)/mcL Final    Lymph # 04/30/2025 1.85  0.6 - 4.6 x10(3)/mcL Final    Mono # 04/30/2025 1.18  0.1 - 1.3 x10(3)/mcL Final    Eos # 04/30/2025 0.24  0 - 0.9 x10(3)/mcL Final    Baso # 04/30/2025 0.13  <=0.2 x10(3)/mcL Final    Imm Gran # 04/30/2025 0.03  0.00 - 0.04 x10(3)/mcL Final       10/29/24: wbc 7.27, hgb 12.6, plt 270, ANC 3.83, iron 65, iron sat 23%, ferritin 54.3, folate 15.7, B12 425, CMP unremarkable.  6/17/24 WBC 7.52, Hgb 13.2, MCV 94.8, Plt 260, ANC 4.45, Iron 65,  na 133, CMP otherwise WNL    2/26/24 WBC 7.94, Hgb 11.9, MCV 95.7, Plt 298, ANC 4.39, Iron 37, TIBC 293, transferrin 279, Ferritin, 41.3, Iron sat 13, na 135, CMP otherwise WNL  11/22/23: cr 0.78, ca 9, LFTs WNL, alb 3.6, wbc 7.91, hgb 11.7, plt 275, ANC 4.38, iron 52, iron sat 17%, ferritin 47.4    08/21/23: cr 0.86, ca 9.5, LFTs WNL, alb 3.6, wbc 7.30, hgb 11.3, plt 350, ANC 3.82, iron 26, iron sat 8%, ferritin 21.30    03/27/2023:  Creatinine 0.7, albumin 3.4, calcium 9.1, LFTs within normal limits, iron 40, TIBC 324,% saturation 12, ferritin 53, WBC count 8.6, hemoglobin 12.3, MCV 82.5, platelet count 265, normal differential.    03/09/2023:  Creatinine 0.79, albumin 3.6, LFTs within normal limits, iron 49, TIBC 313, % saturation 16, ferritin 54, folic acid 12.9, vitamin B12 343, CRP 0.2, WBC count 7.5, hemoglobin 12.0, MCV 80.7, platelet count 309.  Retic count 1.01.  Sedimentation rate 22.  ZENON not detected.  Copper 129.4.  Erythropoietin 10.        Assessment:     IRON DEFICIENCY ANEMIA:   - Admitted initially in November of 2021 with anemia now noted to have duodenal angiectasia status post APC.  Admitted again with anemia in December of 2022 and then in January of 2023 requiring blood transfusions.  Repeat labs on 02/28/2023  with normal hemoglobin of 13.3  - 01/20/2023 EGD:  Normal esophagus, stomach, duodenum    Plan:     Patient continues to tolerate oral iron daily w/ vit C to improve iron levels. She remains asymptomatic (denies fatigue, weakness, sob, dizziness).  Plan to follow-up in 6  months with repeat labs including iron panel.                             This service was not originating from a related E/M service provided within the previous 7 days nor will  to an E/M service or procedure within the next 24 hours or my soonest available appointment.  Prevailing standard of care was able to be met in this audio-only visit.

## 2025-06-09 DIAGNOSIS — I25.10 CORONARY ARTERY DISEASE, UNSPECIFIED VESSEL OR LESION TYPE, UNSPECIFIED WHETHER ANGINA PRESENT, UNSPECIFIED WHETHER NATIVE OR TRANSPLANTED HEART: ICD-10-CM

## 2025-06-09 DIAGNOSIS — I73.9 PAD (PERIPHERAL ARTERY DISEASE): ICD-10-CM

## 2025-06-09 DIAGNOSIS — I10 HYPERTENSION, UNSPECIFIED TYPE: ICD-10-CM

## 2025-06-09 DIAGNOSIS — E78.5 HYPERLIPIDEMIA LDL GOAL <70: ICD-10-CM

## 2025-06-09 RX ORDER — ATORVASTATIN CALCIUM 40 MG/1
40 TABLET, FILM COATED ORAL NIGHTLY
Qty: 90 TABLET | Refills: 3 | Status: SHIPPED | OUTPATIENT
Start: 2025-06-09 | End: 2026-06-09

## 2025-06-09 RX ORDER — NITROGLYCERIN 0.4 MG/1
0.4 TABLET SUBLINGUAL EVERY 5 MIN PRN
Qty: 25 TABLET | Refills: 3 | Status: SHIPPED | OUTPATIENT
Start: 2025-06-09 | End: 2026-06-09

## 2025-06-09 RX ORDER — ASPIRIN 81 MG/1
81 TABLET ORAL DAILY
Qty: 90 TABLET | Refills: 3 | Status: SHIPPED | OUTPATIENT
Start: 2025-06-09

## 2025-06-09 RX ORDER — AMLODIPINE AND BENAZEPRIL HYDROCHLORIDE 10; 20 MG/1; MG/1
1 CAPSULE ORAL DAILY
Qty: 90 CAPSULE | Refills: 3 | Status: SHIPPED | OUTPATIENT
Start: 2025-06-09 | End: 2026-06-09

## 2025-06-09 RX ORDER — METOPROLOL TARTRATE 25 MG/1
12.5 TABLET, FILM COATED ORAL 2 TIMES DAILY
Qty: 90 TABLET | Refills: 3 | Status: SHIPPED | OUTPATIENT
Start: 2025-06-09 | End: 2026-06-09

## 2025-06-09 RX ORDER — EZETIMIBE 10 MG/1
10 TABLET ORAL DAILY
Qty: 90 TABLET | Refills: 3 | Status: SHIPPED | OUTPATIENT
Start: 2025-06-09 | End: 2026-06-09

## 2025-06-09 RX ORDER — RIVAROXABAN 2.5 MG/1
1 TABLET, FILM COATED ORAL 2 TIMES DAILY
Qty: 180 TABLET | Refills: 0 | Status: SHIPPED | OUTPATIENT
Start: 2025-06-09 | End: 2025-09-07

## 2025-06-09 NOTE — TELEPHONE ENCOUNTER
----- Message from [employer] sent at 2025  1:22 PM CDT -----  Regarding: New Rx Scripts  Lv: 24Nv: 25Provider: Mrs. Aguayo requests new scripts for the following (the current scripts are almost  / out of fills):- nitroGLYCERIN (NITROSTAT) 0.4 MG SL tablet- rivaroxaban (XARELTO) 2.5 mg Tab- metoprolol tartrate (LOPRESSOR) 25 MG tablet- ezetimibe (ZETIA) 10 mg tablet- aspirin (ECOTRIN) 81 MG EC tablet- amlodipine-benazepril 10-20mg (LOTREL) 10-20 mg per capsule- POSSIBLY atorvastatin (LIPITOR) 40 MG tablet Pharm: [facility] - [place], [place] - [address]

## 2025-06-17 ENCOUNTER — LAB VISIT (OUTPATIENT)
Dept: LAB | Facility: HOSPITAL | Age: 70
End: 2025-06-17
Attending: NURSE PRACTITIONER
Payer: MEDICARE

## 2025-06-17 DIAGNOSIS — E78.5 HYPERLIPIDEMIA LDL GOAL <70: ICD-10-CM

## 2025-06-17 LAB
ALBUMIN SERPL-MCNC: 3.3 G/DL (ref 3.4–4.8)
ALBUMIN/GLOB SERPL: 0.8 RATIO (ref 1.1–2)
ALP SERPL-CCNC: 52 UNIT/L (ref 40–150)
ALT SERPL-CCNC: 13 UNIT/L (ref 0–55)
ANION GAP SERPL CALC-SCNC: 5 MEQ/L
AST SERPL-CCNC: 21 UNIT/L (ref 11–45)
BILIRUB SERPL-MCNC: 0.8 MG/DL
BUN SERPL-MCNC: 15.4 MG/DL (ref 9.8–20.1)
CALCIUM SERPL-MCNC: 9.3 MG/DL (ref 8.4–10.2)
CHLORIDE SERPL-SCNC: 102 MMOL/L (ref 98–107)
CHOLEST SERPL-MCNC: 130 MG/DL
CHOLEST/HDLC SERPL: 3 {RATIO} (ref 0–5)
CO2 SERPL-SCNC: 28 MMOL/L (ref 23–31)
CREAT SERPL-MCNC: 0.68 MG/DL (ref 0.55–1.02)
CREAT/UREA NIT SERPL: 23
GFR SERPLBLD CREATININE-BSD FMLA CKD-EPI: >60 ML/MIN/1.73/M2
GLOBULIN SER-MCNC: 4 GM/DL (ref 2.4–3.5)
GLUCOSE SERPL-MCNC: 101 MG/DL (ref 82–115)
HDLC SERPL-MCNC: 52 MG/DL (ref 35–60)
LDLC SERPL CALC-MCNC: 62 MG/DL (ref 50–140)
POTASSIUM SERPL-SCNC: 4.6 MMOL/L (ref 3.5–5.1)
PROT SERPL-MCNC: 7.3 GM/DL (ref 5.8–7.6)
SODIUM SERPL-SCNC: 135 MMOL/L (ref 136–145)
TRIGL SERPL-MCNC: 81 MG/DL (ref 37–140)
VLDLC SERPL CALC-MCNC: 16 MG/DL

## 2025-06-17 PROCEDURE — 36415 COLL VENOUS BLD VENIPUNCTURE: CPT

## 2025-06-17 PROCEDURE — 80053 COMPREHEN METABOLIC PANEL: CPT

## 2025-06-17 PROCEDURE — 80061 LIPID PANEL: CPT

## 2025-06-23 ENCOUNTER — OFFICE VISIT (OUTPATIENT)
Dept: CARDIOLOGY | Facility: CLINIC | Age: 70
End: 2025-06-23
Payer: MEDICARE

## 2025-06-23 VITALS
TEMPERATURE: 98 F | DIASTOLIC BLOOD PRESSURE: 67 MMHG | HEART RATE: 60 BPM | WEIGHT: 121.38 LBS | BODY MASS INDEX: 22.92 KG/M2 | HEIGHT: 61 IN | OXYGEN SATURATION: 99 % | RESPIRATION RATE: 18 BRPM | SYSTOLIC BLOOD PRESSURE: 124 MMHG

## 2025-06-23 DIAGNOSIS — I73.9 PAD (PERIPHERAL ARTERY DISEASE): ICD-10-CM

## 2025-06-23 DIAGNOSIS — E78.5 HYPERLIPIDEMIA LDL GOAL <70: ICD-10-CM

## 2025-06-23 DIAGNOSIS — I25.10 CORONARY ARTERY DISEASE INVOLVING NATIVE CORONARY ARTERY OF NATIVE HEART WITHOUT ANGINA PECTORIS: Primary | ICD-10-CM

## 2025-06-23 DIAGNOSIS — Z72.0 TOBACCO USE: ICD-10-CM

## 2025-06-23 DIAGNOSIS — I10 PRIMARY HYPERTENSION: ICD-10-CM

## 2025-06-23 DIAGNOSIS — I25.10 CORONARY ARTERY DISEASE, UNSPECIFIED VESSEL OR LESION TYPE, UNSPECIFIED WHETHER ANGINA PRESENT, UNSPECIFIED WHETHER NATIVE OR TRANSPLANTED HEART: ICD-10-CM

## 2025-06-23 PROCEDURE — 93005 ELECTROCARDIOGRAM TRACING: CPT

## 2025-06-23 PROCEDURE — 99215 OFFICE O/P EST HI 40 MIN: CPT | Mod: PBBFAC,25 | Performed by: NURSE PRACTITIONER

## 2025-06-23 RX ORDER — RIVAROXABAN 2.5 MG/1
1 TABLET, FILM COATED ORAL 2 TIMES DAILY
Qty: 180 TABLET | Refills: 3 | Status: SHIPPED | OUTPATIENT
Start: 2025-06-23 | End: 2026-06-23

## 2025-06-23 NOTE — PROGRESS NOTES
CHIEF COMPLAINT:   Chief Complaint   Patient presents with    Follow-up     Denies any cardiac problems                   Review of patient's allergies indicates:   Allergen Reactions    Naproxen Swelling                                          HPI:  Crystal Lara 69 y.o. female with a past medical history of Nonobstructive CAD per [facility] 2016, PAD s/p common femoral endarterectomy (2021), carotid artery stenosis (<50%  stenosis to BICAs), HTN, HLD, hx GI Bleed and Tobacco Use who presents to cardiology clinic for routine follow up and ongoing care.  Latest Echocardiogram obtained on 3.8.24 demonstrated normal systolic function with a visually estimated ejection fraction of 50-55%, Grade I Diastolic Dysfunction, mild AR, and mild MR, which is improved from previous EF of 45% per ECHO in 2021.   Most recent ischemic evaluation includes a Left Heart Catheterization in 2016 that revealed non obstructive CAD (LAD-50% distal stenosis; Circumflex 30% proximal stenosis, RCA with 40% stenosis).  Medication management was recommended at that time with plans for possible FFR of LAD if patient developed any recurrent angina. (See operative report below).     Today the patient reports that she feels good and denies any cardiovascular complaints.  She appears euvolemic on exam any denies any symptoms of volume overload. She remains fairly active and is able to complete her routine housework and ambulate throughout her day without experiencing any anginal or anginal equivalent symptoms.  The patient endorses compliance with her current medications and is currently tolerating without any issues.  She continues to smoke approximately 5 cigarettes a day and is not ready to quit at this time         Background Information:  At a previous office visit on 11.10.21, patient's right lower extremity DP/PT was not palpable or dopplerable. Dr. Samson was contacted with plans to perform angiogram on 11.11.21.  However, upon arrival at [facility] the  patient was found to be anemic with hemoglobin of 6.8 and was transfused 2 units of PRBC.  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   Arterial ultrasound performed at that time which revealed severe decrease in arterial flow in the right lower extremity.  Ultimately, the patient underwent Common femoral endarterectomy. Echo obtained during that admission revealed LVEF 45%.   To note the patient had subsequent Hospitalizations with GI Bleed in Dec. 2022 and January 2023     CARDIAC TESTING:  ECHO 3.8.24       Left Ventricle: The left ventricle is normal in size. Normal wall motion. There is low normal systolic function with a visually estimated ejection fraction of 50 - 55%. Grade I diastolic dysfunction.    Right Ventricle: Normal right ventricular cavity size. Systolic function is normal.    Aortic Valve: There is mild aortic regurgitation.    Mitral Valve: There is mild regurgitation.    Pulmonary Artery: No pulmonary hypertension    ECHO 12.17.21  There is mild hypokinesis/slightly aneurysmal basal inferior wall motion.  EF is stable with mildly depressed EF estimated at 45%   Normal right ventricular size with preserved RV function  Trileaflet with trace AI   Normal structure with trace tricuspid valvular regurgitation noted    [facility] 4.13.16  Procedure summary  LAD with 50% distal stenosis with moderate atherosclerotic plaque  Circumflex with 30% proximal stenosis  RCA with 40% stenosis distal mid and moderate eccentric atherosclerotic plaque   Normal LVEF 60%   Recommendations   Medical management   Aggressive risk factor management   Consider FFR of LAD if develops recurrent angina                                                                                                                                                                                     Patient Active Problem List   Diagnosis    PAD (peripheral artery disease)    Hyperlipidemia LDL goal <70    Hypertension    Smoking    Symptomatic anemia    Tobacco use    Iron deficiency anemia due to chronic blood loss    Coronary artery disease involving native coronary artery of native heart without angina pectoris     Past Surgical History:   Procedure Laterality Date    ANGIOGRAPHY  12/15/2021    ANGIOGRAPHY Left 09/09/2019    L external iliac stents, atherectomy of L common femoral and L common iliac    ANGIOGRAPHY Left 08/22/2019    diagnostic imagining  of L external iliac, >90% of R external iliac    AUGMENTATION OF BREAST      CAPSULE ENDOSCOPY, ESOPHAGUS THROUGH ILEUM N/A 01/20/2023    Procedure: CAPSULE ENDOSCOPY, ESOPHAGUS THROUGH ILEUM;  Surgeon: Jose Luis Rodriguez MD;  Location: [facility] ENDOSCOPY;  Service: Gastroenterology;  Laterality: N/A;    CORONARY ANGIOPLASTY      ENDARTERECTOMY, COMMON FEMORAL Right 12/17/2021    Surgeon: Kim    ESOPHAGOGASTRODUODENOSCOPY N/A 01/20/2023    Procedure: EGD/VCE placement;  Surgeon: Jose Luis Rodriguez MD;  Location: [facility] ENDOSCOPY;  Service: Gastroenterology;  Laterality: N/A;    PARTIAL HYSTERECTOMY       Social History     Socioeconomic History    Marital status: Single   Tobacco Use    Smoking status: Every Day     Current packs/day: 0.50     Types: Cigarettes    Smokeless tobacco: Never   Substance and Sexual Activity    Alcohol use: Not Currently    Drug use: Never        Family History   Problem Relation Name Age of Onset    Heart disease Mother      Heart attack Mother      Heart failure Father           Current Outpatient Medications:     alendronate (FOSAMAX) 70 MG tablet, Take by mouth., Disp: , Rfl:     amlodipine-benazepril 10-20mg (LOTREL) 10-20 mg per capsule, Take 1 capsule by mouth once daily., Disp: 90 capsule, Rfl: 3    aspirin (ECOTRIN) 81 MG EC tablet, Take 1 tablet (81 mg total) by mouth Daily., Disp: 90 tablet, Rfl: 3     "atorvastatin (LIPITOR) 40 MG tablet, Take 1 tablet (40 mg total) by mouth nightly., Disp: 90 tablet, Rfl: 3    calcium carbonate-vitamin D3 500 mg-10 mcg (400 unit) Tab, , Disp: , Rfl:     cyclobenzaprine (FLEXERIL) 10 MG tablet, Take 10 mg by mouth as needed., Disp: , Rfl:     docusate sodium 100 mg capsule, Take 1 tablet by mouth every other day., Disp: , Rfl:     esomeprazole magnesium 20 mg TbEC, Take 1 capsule by mouth nightly., Disp: , Rfl:     ezetimibe (ZETIA) 10 mg tablet, Take 1 tablet (10 mg total) by mouth Daily., Disp: 90 tablet, Rfl: 3    metoprolol tartrate (LOPRESSOR) 25 MG tablet, Take 0.5 tablets (12.5 mg total) by mouth 2 (two) times a day., Disp: 90 tablet, Rfl: 3    nitroGLYCERIN (NITROSTAT) 0.4 MG SL tablet, Place 1 tablet (0.4 mg total) under the tongue every 5 (five) minutes as needed for Chest pain., Disp: 25 tablet, Rfl: 3    rivaroxaban (XARELTO) 2.5 mg Tab, Take 1 tablet (2.5 mg total) by mouth 2 (two) times a day., Disp: 180 tablet, Rfl: 0    albuterol (PROVENTIL/VENTOLIN HFA) 90 mcg/actuation inhaler, inhale TWO puffs into lungs EVERY 6 HOURS AS NEEDED FOR troublesome cough (Patient not taking: Reported on 6/23/2025), Disp: , Rfl:      ROS:                                                                                                                                                                             Review of Systems   Constitutional: Negative.    Respiratory: Negative.     Cardiovascular: Negative.    Gastrointestinal: Negative.    Musculoskeletal: Negative.    Skin: Negative.    Neurological: Negative.    Psychiatric/Behavioral: Negative.          Blood pressure 124/67, pulse 60, temperature 97.8 °F (36.6 °C), temperature source Oral, resp. rate 18, height 5' 1" (1.549 m), weight 55.1 kg (121 lb 6.4 oz), SpO2 99%.   PE:  Physical Exam  Constitutional:       Appearance: Normal appearance.   HENT:      Head: Normocephalic and atraumatic.   Eyes:      Extraocular Movements: " Extraocular movements intact.      Pupils: Pupils are equal, round, and reactive to light.   Cardiovascular:      Rate and Rhythm: Normal rate and regular rhythm.   Pulmonary:      Effort: Pulmonary effort is normal.      Breath sounds: Normal breath sounds.   Abdominal:      Palpations: Abdomen is soft.   Musculoskeletal:         General: Normal range of motion.      Cervical back: Normal range of motion.   Skin:     General: Skin is warm and dry.   Neurological:      General: No focal deficit present.      Mental Status: She is alert and oriented to person, place, and time.   Psychiatric:         Mood and Affect: Mood normal.         Behavior: Behavior normal.          ASSESSMENT/PLAN:  Nonobstructive CAD per [facility] 2016  - [facility] - Nonobstructive CAD-LAD 50% stenosis, left circumflex 30% proximal stenosis, RCA 40% stenosis.  Recommended medical management at the time with consideration for FFR of LAD if patient developed recurrent angina. (4.13.16)  - EF-50-55%, Grade I DD, mild AR and mild MR per ECHO 3.8.24  - EF -4 5% per ECHO 12.17.21  - Denies any anginal or anginal equivalent symptoms  - Continue Aspirin, Atorvastatin 40 mg, Lotrel 10-20 mg, Metoprolol Tartrate 12.5 mg BID and SL nitro PRN CP  - Counseled on heart healthy diet, exercise as tolerated, and smoking cessation  - EKG today -sinus bradycardia with no acute ischemic changes    PAD s/p common femoral endarterectomy (2021)  - Arterial ultrasound -severe decrease in arterial flow in the right lower extremity. Ultrasound was negative for DVT. (11.11.21)   - Denies Claudication symptoms   - Continue ASA and Xarelto 2.5 mg BID.  The patient reportedly discussed continuation of Xarelto with Dr. Alvarez and is recommended to continue medication if tolerable.  The patient denies any adverse bleeding issues with Xarelto  - Follow up with Dr. Alvarez as directed     Hypertension  - BP at goal 124/67  - Continue Metoprolol Tartrate 12.5 mg BID and Lotrel 10-20 mg  daily   - Counseled on low-salt diet and exercise as tolerated    HLD  - LDL at goal -62  - Continue Lipitor 40 mg and Zetia 10mg. Reports intolerance to lipitor 80mg in the past  - Advised on heart healthy, low-fat low-cholesterol diet and exercise as tolerated      Carotid Artery Stenosis - Asymptomatic   - Carotid US- less than 50% stenosis to bilateral internal carotid arteries (Sept. 2023)    Tobacco Use  - Patient currently smokes 5-6 cigarettes per day and is not ready to quit at this time  - Counseled on the importance of smoking cessation    Hx of GI bleed in Dec. 2021, Dec. 2022 and Jan. 2023  - Continue to Follow with GI clinic as directed     Anemia- stable   - Continue management per hematology     EKG  Follow up in Cardiology Clinic in 8 months or sooner if needed   Follow-up with PCP, GI, hematology and Dr. Alvarez as directed

## 2025-06-23 NOTE — PATIENT INSTRUCTIONS
EKG  Follow up in Cardiology Clinic in 8 months or sooner if needed   Follow-up with PCP, GI, hematology and Dr. Alvarez as directed

## 2025-06-26 LAB
OHS QRS DURATION: 84 MS
OHS QTC CALCULATION: 406 MS

## (undated) DEVICE — TUBING O2 FEMALE CONN 13FT

## (undated) DEVICE — KIT CANIST SUCTION 1200CC

## (undated) DEVICE — COLLECTION SPECIMEN NEPTUNE

## (undated) DEVICE — PILLCAM SB3 EX EXTENDED

## (undated) DEVICE — SOL IRRI STRL WATER 1000ML

## (undated) DEVICE — TIP SUCTION YANKAUER

## (undated) DEVICE — KIT SURGICAL COLON .25 1.1OZ